# Patient Record
Sex: FEMALE | Race: WHITE | NOT HISPANIC OR LATINO | Employment: OTHER | URBAN - METROPOLITAN AREA
[De-identification: names, ages, dates, MRNs, and addresses within clinical notes are randomized per-mention and may not be internally consistent; named-entity substitution may affect disease eponyms.]

---

## 2023-01-25 PROBLEM — E78.00 HYPERCHOLESTEROLEMIA: Status: ACTIVE | Noted: 2023-01-25

## 2023-01-25 PROBLEM — I48.91 ATRIAL FIBRILLATION WITH RVR (MULTI): Status: ACTIVE | Noted: 2023-01-25

## 2023-01-25 PROBLEM — L80 VITILIGO: Status: ACTIVE | Noted: 2023-01-25

## 2023-01-25 PROBLEM — M25.562 ACUTE PAIN OF BOTH KNEES: Status: ACTIVE | Noted: 2023-01-25

## 2023-01-25 PROBLEM — L65.9 ALOPECIA: Status: ACTIVE | Noted: 2023-01-25

## 2023-01-25 PROBLEM — L20.9 ATOPIC DERMATITIS: Status: ACTIVE | Noted: 2023-01-25

## 2023-01-25 PROBLEM — L71.9 ROSACEA: Status: ACTIVE | Noted: 2023-01-25

## 2023-01-25 PROBLEM — M85.80 OSTEOPENIA: Status: ACTIVE | Noted: 2023-01-25

## 2023-01-25 PROBLEM — J30.9 ALLERGIC RHINITIS: Status: ACTIVE | Noted: 2023-01-25

## 2023-01-25 PROBLEM — L72.3 SEBACEOUS CYST: Status: ACTIVE | Noted: 2023-01-25

## 2023-01-25 PROBLEM — K21.9 ESOPHAGEAL REFLUX: Status: ACTIVE | Noted: 2023-01-25

## 2023-01-25 PROBLEM — M25.561 ACUTE PAIN OF BOTH KNEES: Status: ACTIVE | Noted: 2023-01-25

## 2023-01-25 PROBLEM — I10 HYPERTENSION: Status: ACTIVE | Noted: 2023-01-25

## 2023-01-25 PROBLEM — E66.9 OBESITY: Status: ACTIVE | Noted: 2023-01-25

## 2023-01-25 PROBLEM — K22.70 BARRETT'S ESOPHAGUS: Status: ACTIVE | Noted: 2023-01-25

## 2023-01-25 RX ORDER — DESONIDE 0.5 MG/G
CREAM TOPICAL
COMMUNITY
Start: 2021-09-27

## 2023-01-25 RX ORDER — AZELAIC ACID 0.15 G/G
GEL TOPICAL 2 TIMES DAILY
COMMUNITY
Start: 2011-03-22

## 2023-01-25 RX ORDER — METOPROLOL SUCCINATE 25 MG/1
TABLET, EXTENDED RELEASE ORAL
COMMUNITY
Start: 2021-11-04

## 2023-01-25 RX ORDER — FAMOTIDINE 20 MG/1
1 TABLET, FILM COATED ORAL NIGHTLY
COMMUNITY
Start: 2021-04-14 | End: 2023-02-06 | Stop reason: ENTERED-IN-ERROR

## 2023-01-25 RX ORDER — DOFETILIDE 0.5 MG/1
1 CAPSULE ORAL 2 TIMES DAILY
COMMUNITY
Start: 2020-09-21

## 2023-01-25 RX ORDER — LOSARTAN POTASSIUM 100 MG/1
1 TABLET ORAL DAILY
COMMUNITY
Start: 2012-12-06

## 2023-01-25 RX ORDER — AMLODIPINE BESYLATE 2.5 MG/1
1 TABLET ORAL DAILY
COMMUNITY
Start: 2022-07-06 | End: 2023-03-09 | Stop reason: DRUGHIGH

## 2023-01-25 RX ORDER — FUROSEMIDE 20 MG/1
1 TABLET ORAL DAILY PRN
COMMUNITY
Start: 2020-09-24 | End: 2023-04-06 | Stop reason: ALTCHOICE

## 2023-01-25 RX ORDER — MULTIVIT-MIN/FERROUS FUMARATE 15 MG
1 TABLET ORAL DAILY
COMMUNITY
Start: 2020-09-24 | End: 2023-02-06 | Stop reason: ENTERED-IN-ERROR

## 2023-01-25 RX ORDER — PRAVASTATIN SODIUM 20 MG/1
1 TABLET ORAL DAILY
COMMUNITY
Start: 2012-08-02 | End: 2023-12-11 | Stop reason: SDUPTHER

## 2023-01-25 RX ORDER — ASCORBIC ACID 500 MG
1 TABLET ORAL DAILY
COMMUNITY
Start: 2020-09-24

## 2023-01-25 RX ORDER — KETOCONAZOLE 20 MG/ML
SHAMPOO, SUSPENSION TOPICAL
COMMUNITY
Start: 2021-12-08

## 2023-02-06 RX ORDER — FAMOTIDINE 40 MG/1
TABLET, FILM COATED ORAL
COMMUNITY

## 2023-02-06 RX ORDER — METOPROLOL SUCCINATE 50 MG/1
50 TABLET, EXTENDED RELEASE ORAL DAILY
COMMUNITY
End: 2023-04-06 | Stop reason: ALTCHOICE

## 2023-03-09 ENCOUNTER — OFFICE VISIT (OUTPATIENT)
Dept: PRIMARY CARE | Facility: CLINIC | Age: 73
End: 2023-03-09
Payer: MEDICARE

## 2023-03-09 VITALS
SYSTOLIC BLOOD PRESSURE: 151 MMHG | OXYGEN SATURATION: 100 % | DIASTOLIC BLOOD PRESSURE: 63 MMHG | WEIGHT: 184.5 LBS | BODY MASS INDEX: 31.5 KG/M2 | HEIGHT: 64 IN

## 2023-03-09 DIAGNOSIS — Z71.89 ACP (ADVANCE CARE PLANNING): ICD-10-CM

## 2023-03-09 DIAGNOSIS — Z13.39 ALCOHOL SCREENING: ICD-10-CM

## 2023-03-09 DIAGNOSIS — K22.70 BARRETT'S ESOPHAGUS WITHOUT DYSPLASIA: ICD-10-CM

## 2023-03-09 DIAGNOSIS — E78.00 HYPERCHOLESTEROLEMIA: ICD-10-CM

## 2023-03-09 DIAGNOSIS — Z00.00 ROUTINE GENERAL MEDICAL EXAMINATION AT HEALTH CARE FACILITY: Primary | ICD-10-CM

## 2023-03-09 DIAGNOSIS — K21.9 GASTROESOPHAGEAL REFLUX DISEASE WITHOUT ESOPHAGITIS: ICD-10-CM

## 2023-03-09 DIAGNOSIS — Z13.89 SCREENING FOR MULTIPLE CONDITIONS: ICD-10-CM

## 2023-03-09 DIAGNOSIS — E66.09 CLASS 1 OBESITY DUE TO EXCESS CALORIES WITH SERIOUS COMORBIDITY AND BODY MASS INDEX (BMI) OF 32.0 TO 32.9 IN ADULT: ICD-10-CM

## 2023-03-09 DIAGNOSIS — Z00.00 MEDICARE ANNUAL WELLNESS VISIT, SUBSEQUENT: ICD-10-CM

## 2023-03-09 DIAGNOSIS — I10 PRIMARY HYPERTENSION: ICD-10-CM

## 2023-03-09 DIAGNOSIS — I48.91 ATRIAL FIBRILLATION WITH RVR (MULTI): ICD-10-CM

## 2023-03-09 DIAGNOSIS — Z78.9 FULL CODE STATUS: ICD-10-CM

## 2023-03-09 PROCEDURE — G0442 ANNUAL ALCOHOL SCREEN 15 MIN: HCPCS | Performed by: INTERNAL MEDICINE

## 2023-03-09 PROCEDURE — 3077F SYST BP >= 140 MM HG: CPT | Performed by: INTERNAL MEDICINE

## 2023-03-09 PROCEDURE — 3078F DIAST BP <80 MM HG: CPT | Performed by: INTERNAL MEDICINE

## 2023-03-09 PROCEDURE — 1157F ADVNC CARE PLAN IN RCRD: CPT | Performed by: INTERNAL MEDICINE

## 2023-03-09 PROCEDURE — 99214 OFFICE O/P EST MOD 30 MIN: CPT | Performed by: INTERNAL MEDICINE

## 2023-03-09 PROCEDURE — 1160F RVW MEDS BY RX/DR IN RCRD: CPT | Performed by: INTERNAL MEDICINE

## 2023-03-09 PROCEDURE — 3008F BODY MASS INDEX DOCD: CPT | Performed by: INTERNAL MEDICINE

## 2023-03-09 PROCEDURE — 99497 ADVNCD CARE PLAN 30 MIN: CPT | Performed by: INTERNAL MEDICINE

## 2023-03-09 PROCEDURE — 1036F TOBACCO NON-USER: CPT | Performed by: INTERNAL MEDICINE

## 2023-03-09 PROCEDURE — 1170F FXNL STATUS ASSESSED: CPT | Performed by: INTERNAL MEDICINE

## 2023-03-09 PROCEDURE — 1159F MED LIST DOCD IN RCRD: CPT | Performed by: INTERNAL MEDICINE

## 2023-03-09 PROCEDURE — G0439 PPPS, SUBSEQ VISIT: HCPCS | Performed by: INTERNAL MEDICINE

## 2023-03-09 RX ORDER — AMLODIPINE BESYLATE 5 MG/1
5 TABLET ORAL
COMMUNITY
Start: 2023-03-05

## 2023-03-09 SDOH — ECONOMIC STABILITY: HOUSING INSECURITY
IN THE LAST 12 MONTHS, WAS THERE A TIME WHEN YOU DID NOT HAVE A STEADY PLACE TO SLEEP OR SLEPT IN A SHELTER (INCLUDING NOW)?: NO

## 2023-03-09 SDOH — ECONOMIC STABILITY: FOOD INSECURITY: WITHIN THE PAST 12 MONTHS, YOU WORRIED THAT YOUR FOOD WOULD RUN OUT BEFORE YOU GOT MONEY TO BUY MORE.: NEVER TRUE

## 2023-03-09 SDOH — ECONOMIC STABILITY: INCOME INSECURITY: IN THE LAST 12 MONTHS, WAS THERE A TIME WHEN YOU WERE NOT ABLE TO PAY THE MORTGAGE OR RENT ON TIME?: NO

## 2023-03-09 SDOH — ECONOMIC STABILITY: TRANSPORTATION INSECURITY
IN THE PAST 12 MONTHS, HAS THE LACK OF TRANSPORTATION KEPT YOU FROM MEDICAL APPOINTMENTS OR FROM GETTING MEDICATIONS?: NO

## 2023-03-09 SDOH — ECONOMIC STABILITY: FOOD INSECURITY: WITHIN THE PAST 12 MONTHS, THE FOOD YOU BOUGHT JUST DIDN'T LAST AND YOU DIDN'T HAVE MONEY TO GET MORE.: NEVER TRUE

## 2023-03-09 SDOH — ECONOMIC STABILITY: TRANSPORTATION INSECURITY
IN THE PAST 12 MONTHS, HAS LACK OF TRANSPORTATION KEPT YOU FROM MEETINGS, WORK, OR FROM GETTING THINGS NEEDED FOR DAILY LIVING?: NO

## 2023-03-09 ASSESSMENT — ACTIVITIES OF DAILY LIVING (ADL)
BATHING: INDEPENDENT
DOING HOUSEWORK: INDEPENDENT
MANAGING FINANCES: INDEPENDENT
STIL DRIVING: YES
HEARING - LEFT EAR: FUNCTIONAL
PREPARING MEALS: INDEPENDENT
EATING: INDEPENDENT
FEEDING YOURSELF: INDEPENDENT
USING TRANSPORTATION: INDEPENDENT
TAKING MEDICATION: INDEPENDENT
GROCERY SHOPPING: INDEPENDENT
ADEQUATE_TO_COMPLETE_ADL: YES
NEEDS ASSISTANCE WITH FOOD: INDEPENDENT
PATIENT'S MEMORY ADEQUATE TO SAFELY COMPLETE DAILY ACTIVITIES?: YES
TOILETING: INDEPENDENT
JUDGMENT_ADEQUATE_SAFELY_COMPLETE_DAILY_ACTIVITIES: YES
DRESSING YOURSELF: INDEPENDENT
HEARING - RIGHT EAR: FUNCTIONAL
PILL BOX USED: YES
WALKS IN HOME: INDEPENDENT
USING TELEPHONE: INDEPENDENT
GROOMING: INDEPENDENT

## 2023-03-09 ASSESSMENT — ENCOUNTER SYMPTOMS
CHILLS: 0
VOMITING: 0
CONSTIPATION: 0
HEMATURIA: 0
DIZZINESS: 0
DYSURIA: 0
DIFFICULTY URINATING: 0
WEAKNESS: 0
FATIGUE: 0
MYALGIAS: 0
ARTHRALGIAS: 0
NAUSEA: 0
HEADACHES: 0
SHORTNESS OF BREATH: 0
FREQUENCY: 0
FEVER: 0
LIGHT-HEADEDNESS: 0
SORE THROAT: 0
PALPITATIONS: 0
DIARRHEA: 0
COUGH: 0

## 2023-03-09 ASSESSMENT — LIFESTYLE VARIABLES
AUDIT-C TOTAL SCORE: 0
SKIP TO QUESTIONS 9-10: 1
HOW OFTEN DO YOU HAVE SIX OR MORE DRINKS ON ONE OCCASION: NEVER
HOW OFTEN DO YOU HAVE A DRINK CONTAINING ALCOHOL: NEVER
HOW MANY STANDARD DRINKS CONTAINING ALCOHOL DO YOU HAVE ON A TYPICAL DAY: PATIENT DOES NOT DRINK

## 2023-03-09 ASSESSMENT — SOCIAL DETERMINANTS OF HEALTH (SDOH)
WITHIN THE LAST YEAR, HAVE YOU BEEN AFRAID OF YOUR PARTNER OR EX-PARTNER?: NO
DO YOU BELONG TO ANY CLUBS OR ORGANIZATIONS SUCH AS CHURCH GROUPS UNIONS, FRATERNAL OR ATHLETIC GROUPS, OR SCHOOL GROUPS?: NO
HOW OFTEN DO YOU ATTEND CHURCH OR RELIGIOUS SERVICES?: NEVER
IN A TYPICAL WEEK, HOW MANY TIMES DO YOU TALK ON THE PHONE WITH FAMILY, FRIENDS, OR NEIGHBORS?: ONCE A WEEK
ARE YOU MARRIED, WIDOWED, DIVORCED, SEPARATED, NEVER MARRIED, OR LIVING WITH A PARTNER?: NEVER MARRIED
WITHIN THE LAST YEAR, HAVE YOU BEEN HUMILIATED OR EMOTIONALLY ABUSED IN OTHER WAYS BY YOUR PARTNER OR EX-PARTNER?: NO
HOW HARD IS IT FOR YOU TO PAY FOR THE VERY BASICS LIKE FOOD, HOUSING, MEDICAL CARE, AND HEATING?: NOT HARD AT ALL
WITHIN THE LAST YEAR, HAVE TO BEEN RAPED OR FORCED TO HAVE ANY KIND OF SEXUAL ACTIVITY BY YOUR PARTNER OR EX-PARTNER?: NO
WITHIN THE LAST YEAR, HAVE YOU BEEN KICKED, HIT, SLAPPED, OR OTHERWISE PHYSICALLY HURT BY YOUR PARTNER OR EX-PARTNER?: NO
HOW OFTEN DO YOU ATTENT MEETINGS OF THE CLUB OR ORGANIZATION YOU BELONG TO?: NEVER
HOW OFTEN DO YOU GET TOGETHER WITH FRIENDS OR RELATIVES?: ONCE A WEEK

## 2023-03-09 ASSESSMENT — PATIENT HEALTH QUESTIONNAIRE - PHQ9
SUM OF ALL RESPONSES TO PHQ9 QUESTIONS 1 AND 2: 0
1. LITTLE INTEREST OR PLEASURE IN DOING THINGS: NOT AT ALL
2. FEELING DOWN, DEPRESSED OR HOPELESS: NOT AT ALL

## 2023-03-09 ASSESSMENT — PAIN SCALES - GENERAL: PAINLEVEL: 0-NO PAIN

## 2023-03-09 NOTE — ASSESSMENT & PLAN NOTE
Advance care planning was discussed today.  The power of  we have is an old one according to the patient and she has named a different sister.  She will bring us a copy of the new power of .  She has been made it clear that she does not want to be kept alive long-term on machines if she has a poor quality of life.  At this time she request to be a full code and her chart will be marked accordingly.

## 2023-03-09 NOTE — PATIENT INSTRUCTIONS
Please bring a copy of your living will and DURABLE POWER OF  for healthcare to Dr. Garcia's office so we can place a copy in your chart for emergencies.     Follow up Dr LY in 1 month for HTN...Please bring home bp readings to next appt.

## 2023-03-09 NOTE — ASSESSMENT & PLAN NOTE
Patient's blood pressure is elevated in my office today however she states at home it is under good control.  I have asked her to do a series of blood pressure readings at home and follow-up with me in 1 month to see if we need to adjust her blood pressure medications.

## 2023-03-09 NOTE — PROGRESS NOTES
"Subjective   Reason for Visit: Vonnie SALAS Friend is an 72 y.o. female here for a Medicare Wellness visit.          Reviewed all medications by prescribing practitioner or clinical pharmacist (such as prescriptions, OTCs, herbal therapies and supplements) and documented in the medical record.    Patient is here for her annual wellness visit as well as follow-up on her A-fib, reflux with Ferrera's esophagus obesity high cholesterol and medication management.  Overall patient feels well and denies any new complaints.  While going over vaccines it was clear that she is due for Tdap however she turns it down at this time but will consider it again later.  Patient continues to follow-up with Dr. Cosme for cardiology.                              Patient Care Team:  Светлана Garcia MD as PCP - General  Светлана Garcia MD as PCP - [employer]age PCP     Review of Systems   Constitutional:  Negative for chills, fatigue and fever.   HENT:  Negative for sore throat.    Eyes:  Negative for visual disturbance.   Respiratory:  Negative for cough and shortness of breath.    Cardiovascular:  Negative for chest pain, palpitations and leg swelling.   Gastrointestinal:  Negative for constipation, diarrhea, nausea and vomiting.   Genitourinary:  Negative for difficulty urinating, dysuria, frequency, hematuria and urgency.   Musculoskeletal:  Negative for arthralgias and myalgias.   Skin:  Negative for rash.   Neurological:  Negative for dizziness, syncope, weakness, light-headedness and headaches.       Objective   Vitals:  /63 (BP Location: Left arm, Patient Position: Sitting)   Ht 1.613 m (5' 3.5\")   Wt 83.7 kg (184 lb 8 oz)   SpO2 100%   BMI 32.17 kg/m²       Physical Exam  Constitutional:       Appearance: Normal appearance.   HENT:      Head: Normocephalic and atraumatic.      Nose: Nose normal.   Eyes:      Extraocular Movements: Extraocular movements intact.      Pupils: Pupils are equal, round, and reactive to " light.   Cardiovascular:      Rate and Rhythm: Normal rate and regular rhythm.   Pulmonary:      Breath sounds: Normal breath sounds.   Abdominal:      General: Abdomen is flat. Bowel sounds are normal.      Palpations: Abdomen is soft.   Musculoskeletal:      Right lower leg: No edema.      Left lower leg: No edema.   Neurological:      Mental Status: She is alert.     Patient is obese.    Assessment/Plan   Problem List Items Addressed This Visit          Circulatory    Atrial fibrillation with RVR (CMS/[facility])    Current Assessment & Plan     Patient has a history of intermittent atrial fibrillation.  She remains on Eliquis.  Her cardiologist is Dr. Cosme.  She appears to be stable in sinus rhythm at this time         Relevant Medications    amLODIPine (Norvasc) 5 mg tablet    Hypertension    Current Assessment & Plan     Patient's blood pressure is elevated in my office today however she states at home it is under good control.  I have asked her to do a series of blood pressure readings at home and follow-up with me in 1 month to see if we need to adjust her blood pressure medications.            Digestive    Ferrera's esophagus    Current Assessment & Plan     Patient has GERD with Ferrera's esophagus however she is monitored by GI and appears to be stable.         Esophageal reflux    Current Assessment & Plan     Patient denies any breakthrough symptoms and reflux is stable.            Endocrine/Metabolic    Obesity    Current Assessment & Plan     Patient was counseled about decreased caloric intake and improving body weight.            Other    Hypercholesterolemia    Current Assessment & Plan     Cholesterol level is stable with pravastatin 20 mg daily.  She denies muscle aches or other side effects.         Medicare annual wellness visit, subsequent    Current Assessment & Plan     Annual wellness visit completed today         ACP (advance care planning)    Current Assessment & Plan     Advance care planning  was discussed today.  The power of  we have is an old one according to the patient and she has named a different sister.  She will bring us a copy of the new power of .  She has been made it clear that she does not want to be kept alive long-term on machines if she has a poor quality of life.  At this time she request to be a full code and her chart will be marked accordingly.         Alcohol screening    Current Assessment & Plan     Alcohol screen was negative as she does not drink alcohol.         Screening for multiple conditions    Full code status    Current Assessment & Plan     CODE STATUS was reviewed today including DNR comfort care, DNR Comfort Care arrest and full code.  Patient wishes to be a full code and understands this may include CPR, chest compressions, electrical cardioversion, intubation and ventilation.          Other Visit Diagnoses       Routine general medical examination at health care facility    -  Primary

## 2023-03-09 NOTE — ASSESSMENT & PLAN NOTE
Cholesterol level is stable with pravastatin 20 mg daily.  She denies muscle aches or other side effects.

## 2023-03-09 NOTE — ASSESSMENT & PLAN NOTE
Patient has a history of intermittent atrial fibrillation.  She remains on Eliquis.  Her cardiologist is Dr. Cosme.  She appears to be stable in sinus rhythm at this time

## 2023-03-09 NOTE — ASSESSMENT & PLAN NOTE
>>ASSESSMENT AND PLAN FOR [employer]ESITY WRITTEN ON 3/9/2023  4:41 PM BY JULIO NORMAN MD    Patient was counseled about decreased caloric intake and improving body weight.

## 2023-03-09 NOTE — PROGRESS NOTES
"Subjective   Reason for Visit: Vonnie SALAS Friend is an 72 y.o. female here for a Medicare Wellness visit.          Reviewed all medications by prescribing practitioner or clinical pharmacist (such as prescriptions, OTCs, herbal therapies and supplements) and documented in the medical record.    HPI                        Patient Care Team:  Светлана Garcia MD as PCP - General  Светлана Garcia MD as PCP - [employer]vantage PCP     Review of Systems    Objective   Vitals:  /63 (BP Location: Left arm, Patient Position: Sitting)   Ht 1.613 m (5' 3.5\")   Wt 83.7 kg (184 lb 8 oz)   SpO2 100%   BMI 32.17 kg/m²       Physical Exam    Assessment/Plan   Problem List Items Addressed This Visit    None         "

## 2023-03-09 NOTE — ASSESSMENT & PLAN NOTE
CODE STATUS was reviewed today including DNR comfort care, DNR Comfort Care arrest and full code.  Patient wishes to be a full code and understands this may include CPR, chest compressions, electrical cardioversion, intubation and ventilation.

## 2023-04-06 ENCOUNTER — OFFICE VISIT (OUTPATIENT)
Dept: PRIMARY CARE | Facility: CLINIC | Age: 73
End: 2023-04-06
Payer: MEDICARE

## 2023-04-06 VITALS
OXYGEN SATURATION: 99 % | HEART RATE: 55 BPM | DIASTOLIC BLOOD PRESSURE: 68 MMHG | WEIGHT: 183.6 LBS | HEIGHT: 63 IN | SYSTOLIC BLOOD PRESSURE: 130 MMHG | BODY MASS INDEX: 32.53 KG/M2

## 2023-04-06 DIAGNOSIS — I48.91 ATRIAL FIBRILLATION WITH RVR (MULTI): ICD-10-CM

## 2023-04-06 DIAGNOSIS — I10 PRIMARY HYPERTENSION: Primary | ICD-10-CM

## 2023-04-06 DIAGNOSIS — R00.1 BRADYCARDIA: ICD-10-CM

## 2023-04-06 PROBLEM — M81.0 OSTEOPOROSIS: Status: ACTIVE | Noted: 2023-04-06

## 2023-04-06 PROCEDURE — 3008F BODY MASS INDEX DOCD: CPT | Performed by: INTERNAL MEDICINE

## 2023-04-06 PROCEDURE — 3078F DIAST BP <80 MM HG: CPT | Performed by: INTERNAL MEDICINE

## 2023-04-06 PROCEDURE — 99214 OFFICE O/P EST MOD 30 MIN: CPT | Performed by: INTERNAL MEDICINE

## 2023-04-06 PROCEDURE — 1157F ADVNC CARE PLAN IN RCRD: CPT | Performed by: INTERNAL MEDICINE

## 2023-04-06 PROCEDURE — 1160F RVW MEDS BY RX/DR IN RCRD: CPT | Performed by: INTERNAL MEDICINE

## 2023-04-06 PROCEDURE — 3075F SYST BP GE 130 - 139MM HG: CPT | Performed by: INTERNAL MEDICINE

## 2023-04-06 PROCEDURE — 1036F TOBACCO NON-USER: CPT | Performed by: INTERNAL MEDICINE

## 2023-04-06 PROCEDURE — 1159F MED LIST DOCD IN RCRD: CPT | Performed by: INTERNAL MEDICINE

## 2023-04-06 RX ORDER — CAMPHOR 0.45 %
25 GEL (GRAM) TOPICAL
COMMUNITY
Start: 2020-09-22

## 2023-04-06 ASSESSMENT — ENCOUNTER SYMPTOMS
HEADACHES: 0
CONSTIPATION: 0
CHILLS: 0
SORE THROAT: 0
PALPITATIONS: 0
FREQUENCY: 0
DIFFICULTY URINATING: 0
DIARRHEA: 0
MYALGIAS: 0
FATIGUE: 0
DIZZINESS: 0
SHORTNESS OF BREATH: 0
ARTHRALGIAS: 0
COUGH: 0
LIGHT-HEADEDNESS: 0
HEMATURIA: 0
VOMITING: 0
WEAKNESS: 0
DYSURIA: 0
FEVER: 0
NAUSEA: 0

## 2023-04-06 ASSESSMENT — PATIENT HEALTH QUESTIONNAIRE - PHQ9
2. FEELING DOWN, DEPRESSED OR HOPELESS: NOT AT ALL
SUM OF ALL RESPONSES TO PHQ9 QUESTIONS 1 AND 2: 0
1. LITTLE INTEREST OR PLEASURE IN DOING THINGS: NOT AT ALL

## 2023-04-06 ASSESSMENT — PAIN SCALES - GENERAL: PAINLEVEL: 0-NO PAIN

## 2023-04-06 NOTE — ASSESSMENT & PLAN NOTE
Blood pressure is good at home and improved here so no further actions will be taken.  Patient will follow-up with me in 4 months.

## 2023-04-06 NOTE — PROGRESS NOTES
"Subjective   Patient ID: Vonnie A Friend is a 72 y.o. female who presents for GERD and HTN management.  BN    Patient is here for 1 month follow-up on hypertension also to review her pulse with A-fib reflux symptoms.  Patient wanted me to know that she has a med alert from home        Review of Systems   Constitutional:  Negative for chills, fatigue and fever.   HENT:  Negative for sore throat.    Eyes:  Negative for visual disturbance.   Respiratory:  Negative for cough and shortness of breath.    Cardiovascular:  Negative for chest pain, palpitations and leg swelling.   Gastrointestinal:  Negative for constipation, diarrhea, nausea and vomiting.   Genitourinary:  Negative for difficulty urinating, dysuria, frequency, hematuria and urgency.   Musculoskeletal:  Negative for arthralgias and myalgias.   Skin:  Negative for rash.   Neurological:  Negative for dizziness, syncope, weakness, light-headedness and headaches.       Objective   Physical Exam  Constitutional:       Appearance: Normal appearance.   HENT:      Head: Normocephalic and atraumatic.      Nose: Nose normal.   Eyes:      Extraocular Movements: Extraocular movements intact.      Pupils: Pupils are equal, round, and reactive to light.   Cardiovascular:      Rate and Rhythm: Normal rate and regular rhythm.   Pulmonary:      Breath sounds: Normal breath sounds.   Abdominal:      General: Abdomen is flat. Bowel sounds are normal.      Palpations: Abdomen is soft.   Musculoskeletal:      Right lower leg: No edema.      Left lower leg: No edema.   Neurological:      Mental Status: She is alert.     /68   Pulse 55   Ht 1.6 m (5' 3\")   Wt 83.3 kg (183 lb 9.6 oz)   SpO2 99%   BMI 32.52 kg/m²       Assessment/Plan   Problem List Items Addressed This Visit          Circulatory    Atrial fibrillation with RVR (CMS/HCC)    Hypertension - Primary     Blood pressure is good at home and improved here so no further actions will be taken.  Patient will " follow-up with me in 4 months.         Bradycardia     Patient has been somewhat bradycardic with a heart rate in the 40s to 50s.  Dr. Cosme her cardiologist has been backing off on some of her beta-blockers but she is totally asymptomatic so he is comfortable with her at this level.          Follow-up in 4 months  Annual labs due in November         It has been a pleasure seeing you.

## 2023-04-06 NOTE — ASSESSMENT & PLAN NOTE
Patient has been somewhat bradycardic with a heart rate in the 40s to 50s.  Dr. Cosme her cardiologist has been backing off on some of her beta-blockers but she is totally asymptomatic so he is comfortable with her at this level.

## 2023-07-27 ENCOUNTER — TELEPHONE (OUTPATIENT)
Dept: PRIMARY CARE | Facility: CLINIC | Age: 73
End: 2023-07-27
Payer: MEDICARE

## 2023-07-27 NOTE — TELEPHONE ENCOUNTER
Patient notified and read message.  BN     ----- Message from Светлана Garcia MD sent at 7/27/2023  2:20 PM EDT -----  Notify patient she has osteopenia on her dxa or bone density.  This is a precursor to osteoporosis.  She should take a minimum of 2000iu of VitD3 OTC every day and a minimum of 500mg of calcium 3 times a day.  Dr Garcia recommends gummie calcium chews so they are easier to take after each meal.

## 2023-08-01 ENCOUNTER — TELEPHONE (OUTPATIENT)
Dept: PRIMARY CARE | Facility: CLINIC | Age: 73
End: 2023-08-01
Payer: MEDICARE

## 2023-08-01 NOTE — TELEPHONE ENCOUNTER
----- Message from Светлана Garcia MD sent at 8/1/2023  2:18 PM EDT -----  Please notify patient that her mammogram showed no evidence of cancer.  Her next mammogram will be due in 1 year.

## 2023-08-08 ENCOUNTER — OFFICE VISIT (OUTPATIENT)
Dept: PRIMARY CARE | Facility: CLINIC | Age: 73
End: 2023-08-08
Payer: MEDICARE

## 2023-08-08 VITALS
BODY MASS INDEX: 32.99 KG/M2 | DIASTOLIC BLOOD PRESSURE: 60 MMHG | HEART RATE: 51 BPM | HEIGHT: 63 IN | OXYGEN SATURATION: 99 % | WEIGHT: 186.2 LBS | SYSTOLIC BLOOD PRESSURE: 130 MMHG

## 2023-08-08 DIAGNOSIS — L65.9 ALOPECIA: ICD-10-CM

## 2023-08-08 DIAGNOSIS — I73.9 PERIPHERAL VASCULAR DISEASE, UNSPECIFIED (CMS-HCC): Primary | ICD-10-CM

## 2023-08-08 DIAGNOSIS — D68.69 OTHER THROMBOPHILIA (MULTI): ICD-10-CM

## 2023-08-08 DIAGNOSIS — I48.91 ATRIAL FIBRILLATION WITH RVR (MULTI): ICD-10-CM

## 2023-08-08 DIAGNOSIS — K22.70 BARRETT'S ESOPHAGUS WITHOUT DYSPLASIA: ICD-10-CM

## 2023-08-08 DIAGNOSIS — E78.00 HYPERCHOLESTEROLEMIA: ICD-10-CM

## 2023-08-08 DIAGNOSIS — M81.0 AGE-RELATED OSTEOPOROSIS WITHOUT CURRENT PATHOLOGICAL FRACTURE: ICD-10-CM

## 2023-08-08 DIAGNOSIS — I10 PRIMARY HYPERTENSION: ICD-10-CM

## 2023-08-08 DIAGNOSIS — K21.9 GASTROESOPHAGEAL REFLUX DISEASE WITHOUT ESOPHAGITIS: ICD-10-CM

## 2023-08-08 PROCEDURE — 1159F MED LIST DOCD IN RCRD: CPT | Performed by: INTERNAL MEDICINE

## 2023-08-08 PROCEDURE — 3075F SYST BP GE 130 - 139MM HG: CPT | Performed by: INTERNAL MEDICINE

## 2023-08-08 PROCEDURE — 99214 OFFICE O/P EST MOD 30 MIN: CPT | Performed by: INTERNAL MEDICINE

## 2023-08-08 PROCEDURE — 1160F RVW MEDS BY RX/DR IN RCRD: CPT | Performed by: INTERNAL MEDICINE

## 2023-08-08 PROCEDURE — 3078F DIAST BP <80 MM HG: CPT | Performed by: INTERNAL MEDICINE

## 2023-08-08 PROCEDURE — 1126F AMNT PAIN NOTED NONE PRSNT: CPT | Performed by: INTERNAL MEDICINE

## 2023-08-08 PROCEDURE — 1036F TOBACCO NON-USER: CPT | Performed by: INTERNAL MEDICINE

## 2023-08-08 PROCEDURE — 1157F ADVNC CARE PLAN IN RCRD: CPT | Performed by: INTERNAL MEDICINE

## 2023-08-08 PROCEDURE — 3008F BODY MASS INDEX DOCD: CPT | Performed by: INTERNAL MEDICINE

## 2023-08-08 RX ORDER — POLYETHYLENE GLYCOL-3350 AND ELECTROLYTES 236; 6.74; 5.86; 2.97; 22.74 G/274.31G; G/274.31G; G/274.31G; G/274.31G; G/274.31G
POWDER, FOR SOLUTION ORAL
COMMUNITY
Start: 2023-07-19

## 2023-08-08 RX ORDER — ASCORBIC ACID 125 MG
5000 TABLET,CHEWABLE ORAL 2 TIMES DAILY
COMMUNITY

## 2023-08-08 ASSESSMENT — ENCOUNTER SYMPTOMS
MYALGIAS: 0
FREQUENCY: 0
DIZZINESS: 0
PALPITATIONS: 0
VOMITING: 0
NAUSEA: 0
COUGH: 0
ARTHRALGIAS: 0
CONSTIPATION: 0
HEADACHES: 0
FEVER: 0
CHILLS: 0
WEAKNESS: 0
HEMATURIA: 0
LIGHT-HEADEDNESS: 0
DYSURIA: 0
SHORTNESS OF BREATH: 0
DIFFICULTY URINATING: 0
DIARRHEA: 0
FATIGUE: 0
SORE THROAT: 0

## 2023-08-08 ASSESSMENT — PAIN SCALES - GENERAL: PAINLEVEL: 0-NO PAIN

## 2023-08-08 ASSESSMENT — PATIENT HEALTH QUESTIONNAIRE - PHQ9: 1. LITTLE INTEREST OR PLEASURE IN DOING THINGS: NOT AT ALL
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
## 2023-08-08 NOTE — ASSESSMENT & PLAN NOTE
Blood pressure is stable at this time but on the borderline as her systolic is 130.  She is to monitor blood pressure readings at home and call us if it starts going over the 130 [place].
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
## 2023-08-08 NOTE — ASSESSMENT & PLAN NOTE
Patient does have occasional episodes of racing heart at which she takes the extra half dose of metoprolol and it helps.  I told her as long as the half dose of metoprolol is working she can take that periodically.  She only has to do that once or twice a month currently.

## 2023-08-08 NOTE — ASSESSMENT & PLAN NOTE
Patient is stable on pravastatin 20 mg daily but is due for liver check and cholesterol profile before next appointment.

## 2023-08-31 VITALS
HEART RATE: 80 BPM | SYSTOLIC BLOOD PRESSURE: 88 MMHG | RESPIRATION RATE: 13 BRPM | HEART RATE: 71 BPM | SYSTOLIC BLOOD PRESSURE: 92 MMHG | WEIGHT: 185 LBS | SYSTOLIC BLOOD PRESSURE: 100 MMHG | SYSTOLIC BLOOD PRESSURE: 115 MMHG | OXYGEN SATURATION: 99 % | SYSTOLIC BLOOD PRESSURE: 114 MMHG | RESPIRATION RATE: 15 BRPM | SYSTOLIC BLOOD PRESSURE: 87 MMHG | DIASTOLIC BLOOD PRESSURE: 53 MMHG | SYSTOLIC BLOOD PRESSURE: 117 MMHG | HEART RATE: 71 BPM | RESPIRATION RATE: 6 BRPM | DIASTOLIC BLOOD PRESSURE: 47 MMHG | SYSTOLIC BLOOD PRESSURE: 84 MMHG | SYSTOLIC BLOOD PRESSURE: 82 MMHG | HEART RATE: 80 BPM | HEART RATE: 49 BPM | SYSTOLIC BLOOD PRESSURE: 100 MMHG | SYSTOLIC BLOOD PRESSURE: 85 MMHG | SYSTOLIC BLOOD PRESSURE: 115 MMHG | DIASTOLIC BLOOD PRESSURE: 38 MMHG | HEART RATE: 71 BPM | HEART RATE: 55 BPM | DIASTOLIC BLOOD PRESSURE: 35 MMHG | HEART RATE: 70 BPM | SYSTOLIC BLOOD PRESSURE: 82 MMHG | HEART RATE: 43 BPM | HEART RATE: 47 BPM | RESPIRATION RATE: 16 BRPM | HEIGHT: 63 IN | HEART RATE: 70 BPM | HEART RATE: 61 BPM | RESPIRATION RATE: 15 BRPM | HEIGHT: 63 IN | RESPIRATION RATE: 14 BRPM | DIASTOLIC BLOOD PRESSURE: 38 MMHG | SYSTOLIC BLOOD PRESSURE: 92 MMHG | RESPIRATION RATE: 17 BRPM | DIASTOLIC BLOOD PRESSURE: 29 MMHG | DIASTOLIC BLOOD PRESSURE: 29 MMHG | SYSTOLIC BLOOD PRESSURE: 114 MMHG | RESPIRATION RATE: 17 BRPM | RESPIRATION RATE: 6 BRPM | SYSTOLIC BLOOD PRESSURE: 85 MMHG | DIASTOLIC BLOOD PRESSURE: 47 MMHG | RESPIRATION RATE: 14 BRPM | SYSTOLIC BLOOD PRESSURE: 117 MMHG | RESPIRATION RATE: 18 BRPM | DIASTOLIC BLOOD PRESSURE: 32 MMHG | SYSTOLIC BLOOD PRESSURE: 88 MMHG | DIASTOLIC BLOOD PRESSURE: 29 MMHG | DIASTOLIC BLOOD PRESSURE: 30 MMHG | DIASTOLIC BLOOD PRESSURE: 56 MMHG | DIASTOLIC BLOOD PRESSURE: 49 MMHG | RESPIRATION RATE: 6 BRPM | DIASTOLIC BLOOD PRESSURE: 30 MMHG | OXYGEN SATURATION: 98 % | DIASTOLIC BLOOD PRESSURE: 56 MMHG | RESPIRATION RATE: 13 BRPM | RESPIRATION RATE: 17 BRPM | SYSTOLIC BLOOD PRESSURE: 100 MMHG | RESPIRATION RATE: 18 BRPM | DIASTOLIC BLOOD PRESSURE: 49 MMHG | SYSTOLIC BLOOD PRESSURE: 114 MMHG | HEART RATE: 61 BPM | HEIGHT: 63 IN | HEART RATE: 50 BPM | OXYGEN SATURATION: 98 % | SYSTOLIC BLOOD PRESSURE: 84 MMHG | DIASTOLIC BLOOD PRESSURE: 53 MMHG | SYSTOLIC BLOOD PRESSURE: 115 MMHG | HEART RATE: 48 BPM | HEART RATE: 55 BPM | DIASTOLIC BLOOD PRESSURE: 38 MMHG | HEART RATE: 55 BPM | HEART RATE: 48 BPM | DIASTOLIC BLOOD PRESSURE: 56 MMHG | DIASTOLIC BLOOD PRESSURE: 30 MMHG | RESPIRATION RATE: 16 BRPM | DIASTOLIC BLOOD PRESSURE: 32 MMHG | HEART RATE: 48 BPM | WEIGHT: 185 LBS | HEART RATE: 43 BPM | SYSTOLIC BLOOD PRESSURE: 92 MMHG | HEART RATE: 43 BPM | SYSTOLIC BLOOD PRESSURE: 116 MMHG | SYSTOLIC BLOOD PRESSURE: 117 MMHG | SYSTOLIC BLOOD PRESSURE: 116 MMHG | DIASTOLIC BLOOD PRESSURE: 42 MMHG | RESPIRATION RATE: 16 BRPM | SYSTOLIC BLOOD PRESSURE: 82 MMHG | HEART RATE: 80 BPM | TEMPERATURE: 97.7 F | SYSTOLIC BLOOD PRESSURE: 85 MMHG | SYSTOLIC BLOOD PRESSURE: 87 MMHG | OXYGEN SATURATION: 99 % | WEIGHT: 185 LBS | HEART RATE: 47 BPM | SYSTOLIC BLOOD PRESSURE: 87 MMHG | HEART RATE: 61 BPM | DIASTOLIC BLOOD PRESSURE: 42 MMHG | RESPIRATION RATE: 18 BRPM | OXYGEN SATURATION: 98 % | DIASTOLIC BLOOD PRESSURE: 49 MMHG | TEMPERATURE: 97.7 F | HEART RATE: 50 BPM | DIASTOLIC BLOOD PRESSURE: 53 MMHG | DIASTOLIC BLOOD PRESSURE: 47 MMHG | SYSTOLIC BLOOD PRESSURE: 84 MMHG | OXYGEN SATURATION: 99 % | HEART RATE: 70 BPM | TEMPERATURE: 97.7 F | RESPIRATION RATE: 13 BRPM | DIASTOLIC BLOOD PRESSURE: 42 MMHG | RESPIRATION RATE: 15 BRPM | DIASTOLIC BLOOD PRESSURE: 35 MMHG | SYSTOLIC BLOOD PRESSURE: 116 MMHG | DIASTOLIC BLOOD PRESSURE: 32 MMHG | HEART RATE: 47 BPM | HEART RATE: 49 BPM | HEART RATE: 49 BPM | SYSTOLIC BLOOD PRESSURE: 88 MMHG | DIASTOLIC BLOOD PRESSURE: 35 MMHG | RESPIRATION RATE: 14 BRPM | HEART RATE: 50 BPM

## 2023-09-06 ENCOUNTER — AMBULATORY SURGICAL CENTER (OUTPATIENT)
Dept: URBAN - METROPOLITAN AREA SURGERY 12 | Facility: SURGERY | Age: 73
End: 2023-09-06

## 2023-09-06 DIAGNOSIS — K57.30 DIVERTICULOSIS OF LARGE INTESTINE WITHOUT PERFORATION OR ABS: ICD-10-CM

## 2023-09-06 DIAGNOSIS — K64.4 RESIDUAL HEMORRHOIDAL SKIN TAGS: ICD-10-CM

## 2023-09-06 DIAGNOSIS — Z86.010 PERSONAL HISTORY OF COLONIC POLYPS: ICD-10-CM

## 2023-09-06 DIAGNOSIS — K64.8 OTHER HEMORRHOIDS: ICD-10-CM

## 2023-09-06 PROCEDURE — 45378 DIAGNOSTIC COLONOSCOPY: CPT | Performed by: INTERNAL MEDICINE

## 2023-09-12 ENCOUNTER — TELEPHONE (OUTPATIENT)
Dept: PRIMARY CARE | Facility: CLINIC | Age: 73
End: 2023-09-12
Payer: MEDICARE

## 2023-11-20 ENCOUNTER — LAB (OUTPATIENT)
Dept: LAB | Facility: LAB | Age: 73
End: 2023-11-20
Payer: MEDICARE

## 2023-11-20 DIAGNOSIS — E78.00 HYPERCHOLESTEROLEMIA: ICD-10-CM

## 2023-11-20 DIAGNOSIS — L65.9 ALOPECIA: ICD-10-CM

## 2023-11-20 DIAGNOSIS — D68.69 OTHER THROMBOPHILIA (MULTI): ICD-10-CM

## 2023-11-20 DIAGNOSIS — I73.9 PERIPHERAL VASCULAR DISEASE, UNSPECIFIED (CMS-HCC): ICD-10-CM

## 2023-11-20 DIAGNOSIS — I10 PRIMARY HYPERTENSION: ICD-10-CM

## 2023-11-20 DIAGNOSIS — M81.0 AGE-RELATED OSTEOPOROSIS WITHOUT CURRENT PATHOLOGICAL FRACTURE: ICD-10-CM

## 2023-11-20 DIAGNOSIS — I48.91 ATRIAL FIBRILLATION WITH RVR (MULTI): ICD-10-CM

## 2023-11-20 DIAGNOSIS — K21.9 GASTROESOPHAGEAL REFLUX DISEASE WITHOUT ESOPHAGITIS: ICD-10-CM

## 2023-11-20 LAB
ALBUMIN SERPL BCP-MCNC: 4.1 G/DL (ref 3.4–5)
ALP SERPL-CCNC: 56 U/L (ref 33–136)
ALT SERPL W P-5'-P-CCNC: 19 U/L (ref 7–45)
ANION GAP SERPL CALC-SCNC: 12 MMOL/L (ref 10–20)
AST SERPL W P-5'-P-CCNC: 23 U/L (ref 9–39)
BILIRUB SERPL-MCNC: 0.6 MG/DL (ref 0–1.2)
BUN SERPL-MCNC: 13 MG/DL (ref 6–23)
CALCIUM SERPL-MCNC: 10.3 MG/DL (ref 8.6–10.6)
CHLORIDE SERPL-SCNC: 103 MMOL/L (ref 98–107)
CHOLEST SERPL-MCNC: 158 MG/DL (ref 0–199)
CHOLESTEROL/HDL RATIO: 3
CO2 SERPL-SCNC: 29 MMOL/L (ref 21–32)
CREAT SERPL-MCNC: 0.93 MG/DL (ref 0.5–1.05)
ERYTHROCYTE [DISTWIDTH] IN BLOOD BY AUTOMATED COUNT: 12.9 % (ref 11.5–14.5)
GFR SERPL CREATININE-BSD FRML MDRD: 65 ML/MIN/1.73M*2
GLUCOSE SERPL-MCNC: 94 MG/DL (ref 74–99)
HCT VFR BLD AUTO: 39.9 % (ref 36–46)
HDLC SERPL-MCNC: 53.4 MG/DL
HGB BLD-MCNC: 12.6 G/DL (ref 12–16)
LDLC SERPL CALC-MCNC: 76 MG/DL
MCH RBC QN AUTO: 29.6 PG (ref 26–34)
MCHC RBC AUTO-ENTMCNC: 31.6 G/DL (ref 32–36)
MCV RBC AUTO: 94 FL (ref 80–100)
NON HDL CHOLESTEROL: 105 MG/DL (ref 0–149)
NRBC BLD-RTO: 0 /100 WBCS (ref 0–0)
PLATELET # BLD AUTO: 206 X10*3/UL (ref 150–450)
POTASSIUM SERPL-SCNC: 4.2 MMOL/L (ref 3.5–5.3)
PROT SERPL-MCNC: 7 G/DL (ref 6.4–8.2)
RBC # BLD AUTO: 4.25 X10*6/UL (ref 4–5.2)
SODIUM SERPL-SCNC: 140 MMOL/L (ref 136–145)
TRIGL SERPL-MCNC: 141 MG/DL (ref 0–149)
TSH SERPL-ACNC: 2.25 MIU/L (ref 0.44–3.98)
VLDL: 28 MG/DL (ref 0–40)
WBC # BLD AUTO: 6.5 X10*3/UL (ref 4.4–11.3)

## 2023-11-20 PROCEDURE — 82652 VIT D 1 25-DIHYDROXY: CPT

## 2023-11-20 PROCEDURE — 80053 COMPREHEN METABOLIC PANEL: CPT

## 2023-11-20 PROCEDURE — 36415 COLL VENOUS BLD VENIPUNCTURE: CPT

## 2023-11-20 PROCEDURE — 80061 LIPID PANEL: CPT

## 2023-11-20 PROCEDURE — 84443 ASSAY THYROID STIM HORMONE: CPT

## 2023-11-20 PROCEDURE — 85027 COMPLETE CBC AUTOMATED: CPT

## 2023-11-22 LAB — 1,25(OH)2D3 SERPL-MCNC: 11.7 PG/ML (ref 19.9–79.3)

## 2023-12-05 ENCOUNTER — OFFICE VISIT (OUTPATIENT)
Dept: PRIMARY CARE | Facility: CLINIC | Age: 73
End: 2023-12-05
Payer: MEDICARE

## 2023-12-05 VITALS
SYSTOLIC BLOOD PRESSURE: 126 MMHG | BODY MASS INDEX: 33.38 KG/M2 | OXYGEN SATURATION: 100 % | HEART RATE: 51 BPM | WEIGHT: 188.4 LBS | HEIGHT: 63 IN | DIASTOLIC BLOOD PRESSURE: 60 MMHG

## 2023-12-05 DIAGNOSIS — I48.91 ATRIAL FIBRILLATION WITH RVR (MULTI): Primary | ICD-10-CM

## 2023-12-05 DIAGNOSIS — E78.00 HYPERCHOLESTEROLEMIA: ICD-10-CM

## 2023-12-05 DIAGNOSIS — M81.0 AGE-RELATED OSTEOPOROSIS WITHOUT CURRENT PATHOLOGICAL FRACTURE: ICD-10-CM

## 2023-12-05 DIAGNOSIS — I10 PRIMARY HYPERTENSION: ICD-10-CM

## 2023-12-05 PROCEDURE — 1126F AMNT PAIN NOTED NONE PRSNT: CPT | Performed by: INTERNAL MEDICINE

## 2023-12-05 PROCEDURE — 3078F DIAST BP <80 MM HG: CPT | Performed by: INTERNAL MEDICINE

## 2023-12-05 PROCEDURE — 3074F SYST BP LT 130 MM HG: CPT | Performed by: INTERNAL MEDICINE

## 2023-12-05 PROCEDURE — 3008F BODY MASS INDEX DOCD: CPT | Performed by: INTERNAL MEDICINE

## 2023-12-05 PROCEDURE — 1036F TOBACCO NON-USER: CPT | Performed by: INTERNAL MEDICINE

## 2023-12-05 PROCEDURE — 1159F MED LIST DOCD IN RCRD: CPT | Performed by: INTERNAL MEDICINE

## 2023-12-05 PROCEDURE — 99214 OFFICE O/P EST MOD 30 MIN: CPT | Performed by: INTERNAL MEDICINE

## 2023-12-05 PROCEDURE — 1160F RVW MEDS BY RX/DR IN RCRD: CPT | Performed by: INTERNAL MEDICINE

## 2023-12-05 RX ORDER — CHOLECALCIFEROL (VITAMIN D3) 125 MCG
125 CAPSULE ORAL 2 TIMES DAILY
COMMUNITY

## 2023-12-05 ASSESSMENT — ENCOUNTER SYMPTOMS
CHILLS: 0
VOMITING: 0
SORE THROAT: 0
WEAKNESS: 0
FREQUENCY: 0
HEADACHES: 0
SHORTNESS OF BREATH: 0
CONSTIPATION: 0
FEVER: 0
FATIGUE: 0
LIGHT-HEADEDNESS: 0
HEMATURIA: 0
MYALGIAS: 0
DYSURIA: 0
NAUSEA: 0
DIARRHEA: 0
ARTHRALGIAS: 0
DIZZINESS: 0
COUGH: 0
PALPITATIONS: 0
DIFFICULTY URINATING: 0

## 2023-12-05 ASSESSMENT — PATIENT HEALTH QUESTIONNAIRE - PHQ9
1. LITTLE INTEREST OR PLEASURE IN DOING THINGS: NOT AT ALL
2. FEELING DOWN, DEPRESSED OR HOPELESS: NOT AT ALL
SUM OF ALL RESPONSES TO PHQ9 QUESTIONS 1 AND 2: 0

## 2023-12-05 ASSESSMENT — PAIN SCALES - GENERAL: PAINLEVEL: 0-NO PAIN

## 2023-12-05 NOTE — ASSESSMENT & PLAN NOTE
Patient's cholesterol level is good with an LDL of 75 while on pravastatin 20 mg daily.  Liver enzymes remain normal.

## 2023-12-05 NOTE — PROGRESS NOTES
"Subjective   Patient ID: Vonnie A Friend \"Cisco" is a 73 y.o. female who presents for 4 month follow up for HTN and cholesterol management.  BN    Patient is here today for 4-month follow-up for hypertension, A-fib, high cholesterol osteoporosis and medication management.  Patient initially wanted the diagnosis of osteoporosis removed from her diagnosis list.  I explained to the patient that although her bone density had improved to osteopenia she still carries a diagnosis of osteoporosis for the rest of her life.      Patient completed blood work on November 20 and it was reviewed with her in detail today.          Review of Systems   Constitutional:  Negative for chills, fatigue and fever.   HENT:  Negative for sore throat.    Eyes:  Negative for visual disturbance.   Respiratory:  Negative for cough and shortness of breath.    Cardiovascular:  Negative for chest pain, palpitations and leg swelling.   Gastrointestinal:  Negative for constipation, diarrhea, nausea and vomiting.   Genitourinary:  Negative for difficulty urinating, dysuria, frequency, hematuria and urgency.   Musculoskeletal:  Negative for arthralgias and myalgias.   Skin:  Negative for rash.   Neurological:  Negative for dizziness, syncope, weakness, light-headedness and headaches.       Objective   Medication Documentation Review Audit       Reviewed by Светлана Garcia MD (Physician) on 12/05/23 at 1141      Medication Order Taking? Sig Documenting Provider Last Dose Status   amLODIPine (Norvasc) 5 mg tablet [id number]  1 tablet (5 mg). Historical Provider, MD  Active   apixaban (Eliquis) 5 mg tablet [id number]  Take 1 tablet (5 mg) by mouth in the morning and 1 tablet (5 mg) before bedtime. Historical Provider, MD  Active   ascorbic acid (Vitamin C) 500 mg tablet [id number]  Take 1 tablet (500 mg) by mouth once daily. Historical Provider, MD  Active   azelaic acid (Finacea) 15 % gel [id number]  twice a day. Apply Sparingly and Massage in Well to Affected " Area(s) Verenice Varghese MD  Active   biotin 5,000 mcg tablet,chewable [id number]  Chew 5,000 mcg 2 times a day. Historical MD Abimael  Active   CALCIUM ORAL [id number]  Take 1 tablet by mouth in the morning, at noon, and at bedtime. Verenice Varghese MD  Active   desonide (DesOwen) 0.05 % cream [id number]  Desonide 0.05 % External Cream   Quantity: 15  Refills: 0        Start : 27-Sep-2021   Active Verenice Varghese MD  Active   diphenhydrAMINE HCL (BenadryL) 2 % gel [id number]  25 mg. Verenice Varghese MD  Active   dofetilide (Tikosyn) 500 mcg capsule [id number]  Take 1 capsule (500 mcg) by mouth in the morning and 1 capsule (500 mcg) before bedtime. Historical MD Abimael  Active   famotidine (Pepcid) 40 mg tablet [id number]  Take by mouth. Historical MD Abimael  Active   GaviLyte-G 236-22.74-6.74 -5.86 gram solution [id number]   Historical MD Abimael  Active   glucosamine/chondro howell A/C/Mn (GLUCOSAMINE-CHONDROITIN COMPLX ORAL) [id number]  Take 1 capsule by mouth in the morning and at bedtime. Verenice Varghese MD  Active   ketoconazole (NIZOral) 2 % shampoo [id number]  Ketoconazole 2 % External Shampoo   Quantity: 360  Refills: 0        Start : 8-Dec-2021   Active Verenice Varghese MD  Active   losartan (Cozaar) 100 mg tablet [id number]  Take 1 tablet (100 mg) by mouth once daily. Historical MD Abimael  Active   metoprolol succinate XL (Toprol-XL) 25 mg 24 hr tablet [id number]  Take by mouth. Alternate 1 whole tablet (25mg) per night with 1/2 tablet per night per Dr Cosme's office 12/12/22 Verenice Varghese MD  Active   MINOXIDIL TOP [id number]  Minoxidil SOLN   Refills: 0       Active Verenice Varghese MD  Active   MULTIVITAMIN ORAL [id number]  Take 1 tablet by mouth 1 (one) time each day. Verenice Varghese MD  Active   omega 3-dha-epa-fish oil 1,000 mg (250 mg-750 mg)/5 mL liquid [id number]  Take 1,000 mcg by mouth 2 times a day. Historical MD Abimael  Active   pravastatin (Pravachol) 20 mg tablet [id number]  Take  "1 tablet (20 mg) by mouth once daily. Historical Provider, MD  Active                  Allergies   Allergen Reactions    Lisinopril Cough    Oxycodone-Acetaminophen Unknown     Physical Exam  Constitutional:       Appearance: Normal appearance. She is obese.   HENT:      Head: Normocephalic and atraumatic.      Nose: Nose normal.   Eyes:      Extraocular Movements: Extraocular movements intact.      Pupils: Pupils are equal, round, and reactive to light.   Cardiovascular:      Rate and Rhythm: Normal rate and regular rhythm.   Pulmonary:      Breath sounds: Normal breath sounds.   Abdominal:      General: Abdomen is flat. Bowel sounds are normal.      Palpations: Abdomen is soft.   Musculoskeletal:      Right lower leg: No edema.      Left lower leg: No edema.   Neurological:      Mental Status: She is alert.     /60   Pulse 51   Ht 1.6 m (5' 3\")   Wt 85.5 kg (188 lb 6.4 oz)   SpO2 100%   BMI 33.37 kg/m²       Assessment/Plan   Problem List Items Addressed This Visit       Atrial fibrillation with RVR (CMS/[facility]) - Primary    Hypercholesterolemia     Patient's cholesterol level is good with an LDL of 75 while on pravastatin 20 mg daily.  Liver enzymes remain normal.         Hypertension     Hypertension is stable and well-controlled.         Osteoporosis              It has been a pleasure seeing you.  Светлана Garcia MD    "

## 2023-12-11 DIAGNOSIS — E78.00 HYPERCHOLESTEROLEMIA: ICD-10-CM

## 2023-12-11 RX ORDER — PRAVASTATIN SODIUM 20 MG/1
20 TABLET ORAL DAILY
Qty: 90 TABLET | Refills: 0 | Status: SHIPPED | OUTPATIENT
Start: 2023-12-11 | End: 2024-03-20 | Stop reason: SDUPTHER

## 2024-01-10 ENCOUNTER — TELEPHONE (OUTPATIENT)
Dept: PRIMARY CARE | Facility: CLINIC | Age: 74
End: 2024-01-10
Payer: MEDICARE

## 2024-01-10 NOTE — TELEPHONE ENCOUNTER
"Pt called stating she feels \"off\" she felt light headed and has h/o syncope. She denies any nausea or vomiting. No fever. She just felt flushed. Her BP was 112/55 pulse 54. Please advise.  "

## 2024-03-20 ENCOUNTER — OFFICE VISIT (OUTPATIENT)
Dept: PRIMARY CARE | Facility: CLINIC | Age: 74
End: 2024-03-20
Payer: MEDICARE

## 2024-03-20 VITALS
HEIGHT: 63 IN | DIASTOLIC BLOOD PRESSURE: 60 MMHG | OXYGEN SATURATION: 100 % | BODY MASS INDEX: 33.52 KG/M2 | WEIGHT: 189.2 LBS | SYSTOLIC BLOOD PRESSURE: 138 MMHG | HEART RATE: 45 BPM

## 2024-03-20 DIAGNOSIS — Z13.39 ALCOHOL SCREENING: ICD-10-CM

## 2024-03-20 DIAGNOSIS — I48.91 ATRIAL FIBRILLATION WITH RVR (MULTI): ICD-10-CM

## 2024-03-20 DIAGNOSIS — I10 PRIMARY HYPERTENSION: ICD-10-CM

## 2024-03-20 DIAGNOSIS — E78.00 HYPERCHOLESTEROLEMIA: ICD-10-CM

## 2024-03-20 DIAGNOSIS — Z78.9 FULL CODE STATUS: ICD-10-CM

## 2024-03-20 DIAGNOSIS — D68.69 OTHER THROMBOPHILIA (MULTI): ICD-10-CM

## 2024-03-20 DIAGNOSIS — Z13.89 SCREENING FOR MULTIPLE CONDITIONS: ICD-10-CM

## 2024-03-20 DIAGNOSIS — Z00.00 WELLNESS EXAMINATION: ICD-10-CM

## 2024-03-20 DIAGNOSIS — I73.9 PERIPHERAL VASCULAR DISEASE, UNSPECIFIED (CMS-HCC): ICD-10-CM

## 2024-03-20 DIAGNOSIS — Z12.31 SCREENING MAMMOGRAM FOR BREAST CANCER: ICD-10-CM

## 2024-03-20 DIAGNOSIS — E66.01 MORBID (SEVERE) OBESITY DUE TO EXCESS CALORIES (MULTI): ICD-10-CM

## 2024-03-20 DIAGNOSIS — Z00.00 ROUTINE GENERAL MEDICAL EXAMINATION AT HEALTH CARE FACILITY: Primary | ICD-10-CM

## 2024-03-20 PROBLEM — L72.3 SEBACEOUS CYST: Status: RESOLVED | Noted: 2023-01-25 | Resolved: 2024-03-20

## 2024-03-20 PROCEDURE — 1160F RVW MEDS BY RX/DR IN RCRD: CPT | Performed by: INTERNAL MEDICINE

## 2024-03-20 PROCEDURE — 99214 OFFICE O/P EST MOD 30 MIN: CPT | Performed by: INTERNAL MEDICINE

## 2024-03-20 PROCEDURE — 3075F SYST BP GE 130 - 139MM HG: CPT | Performed by: INTERNAL MEDICINE

## 2024-03-20 PROCEDURE — 1157F ADVNC CARE PLAN IN RCRD: CPT | Performed by: INTERNAL MEDICINE

## 2024-03-20 PROCEDURE — 1036F TOBACCO NON-USER: CPT | Performed by: INTERNAL MEDICINE

## 2024-03-20 PROCEDURE — 1170F FXNL STATUS ASSESSED: CPT | Performed by: INTERNAL MEDICINE

## 2024-03-20 PROCEDURE — 3078F DIAST BP <80 MM HG: CPT | Performed by: INTERNAL MEDICINE

## 2024-03-20 PROCEDURE — 1159F MED LIST DOCD IN RCRD: CPT | Performed by: INTERNAL MEDICINE

## 2024-03-20 PROCEDURE — 1126F AMNT PAIN NOTED NONE PRSNT: CPT | Performed by: INTERNAL MEDICINE

## 2024-03-20 PROCEDURE — G0442 ANNUAL ALCOHOL SCREEN 15 MIN: HCPCS | Performed by: INTERNAL MEDICINE

## 2024-03-20 PROCEDURE — G0439 PPPS, SUBSEQ VISIT: HCPCS | Performed by: INTERNAL MEDICINE

## 2024-03-20 RX ORDER — PRAVASTATIN SODIUM 20 MG/1
20 TABLET ORAL DAILY
Qty: 90 TABLET | Refills: 3 | Status: SHIPPED | OUTPATIENT
Start: 2024-03-20

## 2024-03-20 SDOH — ECONOMIC STABILITY: INCOME INSECURITY: IN THE LAST 12 MONTHS, WAS THERE A TIME WHEN YOU WERE NOT ABLE TO PAY THE MORTGAGE OR RENT ON TIME?: NO

## 2024-03-20 SDOH — ECONOMIC STABILITY: GENERAL
WHICH OF THE FOLLOWING WOULD YOU LIKE TO GET CONNECTED TO IN ORDER TO RECEIVE A DISCOUNT OR FOR FREE? (CHOOSE ALL THAT APPLY): NONE OF THESE

## 2024-03-20 SDOH — HEALTH STABILITY: PHYSICAL HEALTH: ON AVERAGE, HOW MANY DAYS PER WEEK DO YOU ENGAGE IN MODERATE TO STRENUOUS EXERCISE (LIKE A BRISK WALK)?: 2 DAYS

## 2024-03-20 SDOH — ECONOMIC STABILITY: FOOD INSECURITY: WITHIN THE PAST 12 MONTHS, YOU WORRIED THAT YOUR FOOD WOULD RUN OUT BEFORE YOU GOT MONEY TO BUY MORE.: NEVER TRUE

## 2024-03-20 SDOH — ECONOMIC STABILITY: FOOD INSECURITY: WITHIN THE PAST 12 MONTHS, THE FOOD YOU BOUGHT JUST DIDN'T LAST AND YOU DIDN'T HAVE MONEY TO GET MORE.: NEVER TRUE

## 2024-03-20 SDOH — ECONOMIC STABILITY: HOUSING INSECURITY: IN THE LAST 12 MONTHS, HOW MANY PLACES HAVE YOU LIVED?: 1

## 2024-03-20 SDOH — ECONOMIC STABILITY: GENERAL
WHICH OF THE FOLLOWING DO YOU KNOW HOW TO USE AND HAVE ACCESS TO EVERY DAY? (CHOOSE ALL THAT APPLY): DESKTOP COMPUTER, LAPTOP COMPUTER, OR TABLET WITH BROADBAND INTERNET CONNECTION;SMARTPHONE WITH CELLULAR DATA PLAN

## 2024-03-20 SDOH — HEALTH STABILITY: PHYSICAL HEALTH: ON AVERAGE, HOW MANY MINUTES DO YOU ENGAGE IN EXERCISE AT THIS LEVEL?: 20 MIN

## 2024-03-20 ASSESSMENT — SOCIAL DETERMINANTS OF HEALTH (SDOH)
ARE YOU MARRIED, WIDOWED, DIVORCED, SEPARATED, NEVER MARRIED, OR LIVING WITH A PARTNER?: NEVER MARRIED
IN THE PAST 12 MONTHS, HAS THE ELECTRIC, GAS, OIL, OR WATER COMPANY THREATENED TO SHUT OFF SERVICE IN YOUR HOME?: NO
HOW OFTEN DO YOU GET TOGETHER WITH FRIENDS OR RELATIVES?: TWICE A WEEK
IN A TYPICAL WEEK, HOW MANY TIMES DO YOU TALK ON THE PHONE WITH FAMILY, FRIENDS, OR NEIGHBORS?: MORE THAN THREE TIMES A WEEK
WITHIN THE LAST YEAR, HAVE YOU BEEN KICKED, HIT, SLAPPED, OR OTHERWISE PHYSICALLY HURT BY YOUR PARTNER OR EX-PARTNER?: NO
DO YOU BELONG TO ANY CLUBS OR ORGANIZATIONS SUCH AS CHURCH GROUPS UNIONS, FRATERNAL OR ATHLETIC GROUPS, OR SCHOOL GROUPS?: NO
HOW OFTEN DO YOU ATTEND CHURCH OR RELIGIOUS SERVICES?: NEVER
HOW OFTEN DO YOU ATTENT MEETINGS OF THE CLUB OR ORGANIZATION YOU BELONG TO?: NEVER
WITHIN THE LAST YEAR, HAVE YOU BEEN AFRAID OF YOUR PARTNER OR EX-PARTNER?: NO
WITHIN THE LAST YEAR, HAVE YOU BEEN HUMILIATED OR EMOTIONALLY ABUSED IN OTHER WAYS BY YOUR PARTNER OR EX-PARTNER?: NO
HOW HARD IS IT FOR YOU TO PAY FOR THE VERY BASICS LIKE FOOD, HOUSING, MEDICAL CARE, AND HEATING?: NOT HARD AT ALL
WITHIN THE LAST YEAR, HAVE TO BEEN RAPED OR FORCED TO HAVE ANY KIND OF SEXUAL ACTIVITY BY YOUR PARTNER OR EX-PARTNER?: NO

## 2024-03-20 ASSESSMENT — LIFESTYLE VARIABLES
HOW OFTEN DO YOU HAVE A DRINK CONTAINING ALCOHOL: MONTHLY OR LESS
HOW OFTEN DO YOU HAVE SIX OR MORE DRINKS ON ONE OCCASION: NEVER
HOW MANY STANDARD DRINKS CONTAINING ALCOHOL DO YOU HAVE ON A TYPICAL DAY: 1 OR 2
AUDIT-C TOTAL SCORE: 1
SKIP TO QUESTIONS 9-10: 1

## 2024-03-20 ASSESSMENT — ENCOUNTER SYMPTOMS
VOMITING: 0
FREQUENCY: 0
DIARRHEA: 0
SHORTNESS OF BREATH: 0
CHILLS: 0
HEADACHES: 0
DYSURIA: 0
DIZZINESS: 0
WEAKNESS: 0
FEVER: 0
LIGHT-HEADEDNESS: 0
COUGH: 0
NAUSEA: 0
PALPITATIONS: 0
DIFFICULTY URINATING: 0
HEMATURIA: 0
SORE THROAT: 0
MYALGIAS: 0
ARTHRALGIAS: 0
CONSTIPATION: 0
FATIGUE: 0

## 2024-03-20 ASSESSMENT — PAIN SCALES - GENERAL: PAINLEVEL: 0-NO PAIN

## 2024-03-20 ASSESSMENT — ACTIVITIES OF DAILY LIVING (ADL)
EATING: INDEPENDENT
FEEDING YOURSELF: INDEPENDENT
USING TELEPHONE: INDEPENDENT
PILL BOX USED: YES
TOILETING: INDEPENDENT
ADEQUATE_TO_COMPLETE_ADL: YES
GROOMING: INDEPENDENT
JUDGMENT_ADEQUATE_SAFELY_COMPLETE_DAILY_ACTIVITIES: YES
TAKING MEDICATION: INDEPENDENT
JUDGMENT_ADEQUATE_SAFELY_COMPLETE_DAILY_ACTIVITIES: YES
BATHING: INDEPENDENT
STIL DRIVING: YES
HEARING - RIGHT EAR: FUNCTIONAL
TOILETING: INDEPENDENT
WALKS IN HOME: INDEPENDENT
USING TRANSPORTATION: INDEPENDENT
PREPARING MEALS: INDEPENDENT
PATIENT'S MEMORY ADEQUATE TO SAFELY COMPLETE DAILY ACTIVITIES?: YES
HEARING - LEFT EAR: FUNCTIONAL
ADEQUATE_TO_COMPLETE_ADL: YES
BATHING: INDEPENDENT
DRESSING: INDEPENDENT
MANAGING FINANCES: INDEPENDENT
DRESSING YOURSELF: INDEPENDENT
NEEDS ASSISTANCE WITH FOOD: INDEPENDENT
FEEDING: INDEPENDENT
DOING HOUSEWORK: INDEPENDENT
GROCERY SHOPPING: INDEPENDENT

## 2024-03-20 ASSESSMENT — PATIENT HEALTH QUESTIONNAIRE - PHQ9
2. FEELING DOWN, DEPRESSED OR HOPELESS: NOT AT ALL
1. LITTLE INTEREST OR PLEASURE IN DOING THINGS: NOT AT ALL
SUM OF ALL RESPONSES TO PHQ9 QUESTIONS 1 AND 2: 0

## 2024-03-20 NOTE — PROGRESS NOTES
"Subjective   Reason for Visit: Vonnie SALAS Friend is an 73 y.o. female here for a Medicare Wellness visit.     Past Medical, Surgical, and Family History reviewed and updated in chart.    Reviewed all medications by prescribing practitioner or clinical pharmacist (such as prescriptions, OTCs, herbal therapies and supplements) and documented in the medical record.    Patient is here today for annual wellness visit as well as management of her medical problems including peripheral vascular disease, hypertension high cholesterol and A-fib.  Overall she feels well and has no new complaints.  She continues to follow-up with Dr. Cosme her cardiologist.  We also discussed CODE STATUS today and she would like to be a full code.  She does have a power of  on record with her and it is first sister Maral Orozco.        Patient Care Team:  Светлана Garcia MD as PCP - General  Светлана Garcia MD as PCP - [employer] PCP     Review of Systems   Constitutional:  Negative for chills, fatigue and fever.   HENT:  Negative for sore throat.    Eyes:  Negative for visual disturbance.   Respiratory:  Negative for cough and shortness of breath.    Cardiovascular:  Negative for chest pain, palpitations and leg swelling.   Gastrointestinal:  Negative for constipation, diarrhea, nausea and vomiting.   Genitourinary:  Negative for difficulty urinating, dysuria, frequency, hematuria and urgency.   Musculoskeletal:  Negative for arthralgias and myalgias.   Skin:  Negative for rash.   Neurological:  Negative for dizziness, syncope, weakness, light-headedness and headaches.       Objective   Vitals:  /60   Pulse (!) 45   Ht 1.6 m (5' 3\")   Wt 85.8 kg (189 lb 3.2 oz)   SpO2 100%   BMI 33.52 kg/m²       Physical Exam  Constitutional:       Appearance: Normal appearance. She is obese.   HENT:      Head: Normocephalic and atraumatic.      Nose: Nose normal.   Eyes:      Extraocular Movements: Extraocular movements " intact.      Pupils: Pupils are equal, round, and reactive to light.   Cardiovascular:      Rate and Rhythm: Normal rate and regular rhythm.   Pulmonary:      Breath sounds: Normal breath sounds.   Abdominal:      General: Abdomen is flat. Bowel sounds are normal.      Palpations: Abdomen is soft.   Musculoskeletal:      Right lower leg: No edema.      Left lower leg: No edema.   Neurological:      Mental Status: She is alert.         Assessment/Plan   Problem List Items Addressed This Visit       Atrial fibrillation with RVR (CMS/HCC)    Hypercholesterolemia    Relevant Medications    pravastatin (Pravachol) 20 mg tablet    Peripheral vascular disease, unspecified (CMS/HCC)    Other thrombophilia (CMS/HCC)    Morbid (severe) obesity due to excess calories (CMS/HCC)     Other Visit Diagnoses       Routine general medical examination at health care facility    -  Primary    Screening mammogram for breast cancer        Relevant Orders    BI mammo bilateral screening tomosynthesis

## 2024-03-20 NOTE — ASSESSMENT & PLAN NOTE
PRE-OP INSTRUCTIONS FOR THE SURGICAL PATIENT YOU ARE UNABLE TO MAKE CONTACT FOR AN INTERVIEW:      1. Follow instructions for your ARRIVAL TIME as DIRECTED BY YOUR SURGEON. 2. Enter the MAIN entrance located on [employer] [address] and report to the desk. 3. Bring your insurance & prescription card and photo ID with you. You may also be asked to pay a co-pay, as you may want to bring a check or credit card with you. 4. Leave all other valuables at home. 5. Arrange for someone to drive you home and be with you for the first 24 hours after discharge. 6. You must contact your surgeon for ALL medication instructions, especially if taking blood thinners, aspirin, or diabetic medication. 7. A Pre-op History and Physical for surgery MUST be completed by your Physician or an Urgent Care within 30 days of your procedure date. Please bring a copy with you on the day of your procedure and along with any other testing performed. 8. DO NOT EAT ANYTHING eight hours prior to surgery. May have up to 8 ounces of water 4 hours prior to surgery. 9. No gum, candy, mints, or ice chips day of procedure. 10. Please refrain from drinking alcohol the day before or day of your procedure. 11. Please do not smoke the day of your procedure. 12. Dress in loose, comfortable clothing appropriate for redressing after your procedure. Do not wear jewelry (including body piercings), make-up, fingernail polish, lotion, powders or metal hairclips. 15. Contacts will need to be removed prior to surgery. You may want to bring your            Eye glasses to wear immediately before and after surgery. 14. Dentures will need to be removed before your procedure. 13. Bring cases for your glasses, contacts, dentures, or hearing aids to protect them while you are in surgery. 16. If you use a CPAP, please bring it with you on the day of your procedure.   17. Do not shave or wax for 72 hours prior to procedure near your Patient was counseled about low-fat diet and caloric restriction as well as increasing exercise and fluids to help with weight management.

## 2024-03-20 NOTE — ASSESSMENT & PLAN NOTE
Patient remains anticoagulated with Eliquis and is rate controlled.  In fact her rate is very low only in the 40s but she is asymptomatic.  She continues follow-up with Dr. Cosme her cardiologist

## 2024-03-20 NOTE — ASSESSMENT & PLAN NOTE
Annual wellness visit completed.  Safety issues reviewed in the home and there were none found.  Patient remains independent in all her ADLs.  CODE STATUS was updated to reflect full code and living will and power of  are on record.    Patient was given a requisition for mammogram due in July  Annual labs due in November  Bone density is not due until next year

## 2024-03-20 NOTE — ASSESSMENT & PLAN NOTE
Patient is due for annual blood work in November and remains stable and controlled on pravastatin 20 mg daily

## 2024-03-20 NOTE — ASSESSMENT & PLAN NOTE
CODE STATUS was discussed with the patient and I explained the difference between a DNR comfort care, DNR Comfort Care arrest and full code.  Patient wishes to be a full code.    Patient also has a power of  and living will on record with us.  Her power of  is first her sister Maral Orozco, followed by her second Sister Barbara Rosa, and third is her friend Dahianastephanie Bain Clint.

## 2024-03-20 NOTE — PATIENT INSTRUCTIONS
Consider DNR Comfort care Arrest for your mom      Get mammogram after 7/28/24    Follow up Dr Garcia in 4 months for HTN chol etc  30 min appt

## 2024-03-20 NOTE — ASSESSMENT & PLAN NOTE
Blood pressure is mildly elevated at 138/60 today but she states at home it is usually in the 120s over 50s to 60s.

## 2024-07-31 ENCOUNTER — HOSPITAL ENCOUNTER (OUTPATIENT)
Dept: RADIOLOGY | Facility: CLINIC | Age: 74
Discharge: HOME | End: 2024-07-31
Payer: MEDICARE

## 2024-07-31 VITALS — HEIGHT: 63 IN | WEIGHT: 180 LBS | BODY MASS INDEX: 31.89 KG/M2

## 2024-07-31 DIAGNOSIS — Z12.31 SCREENING MAMMOGRAM FOR BREAST CANCER: ICD-10-CM

## 2024-07-31 PROCEDURE — 77067 SCR MAMMO BI INCL CAD: CPT

## 2024-07-31 PROCEDURE — 77067 SCR MAMMO BI INCL CAD: CPT | Performed by: RADIOLOGY

## 2024-07-31 PROCEDURE — 77063 BREAST TOMOSYNTHESIS BI: CPT | Performed by: RADIOLOGY

## 2024-08-02 ENCOUNTER — APPOINTMENT (OUTPATIENT)
Dept: PRIMARY CARE | Facility: CLINIC | Age: 74
End: 2024-08-02
Payer: MEDICARE

## 2024-08-02 VITALS
DIASTOLIC BLOOD PRESSURE: 60 MMHG | BODY MASS INDEX: 32.64 KG/M2 | HEIGHT: 63 IN | OXYGEN SATURATION: 95 % | WEIGHT: 184.2 LBS | HEART RATE: 43 BPM | SYSTOLIC BLOOD PRESSURE: 126 MMHG

## 2024-08-02 DIAGNOSIS — G89.29 CHRONIC MIDLINE LOW BACK PAIN WITHOUT SCIATICA: ICD-10-CM

## 2024-08-02 DIAGNOSIS — K22.70 BARRETT'S ESOPHAGUS WITHOUT DYSPLASIA: ICD-10-CM

## 2024-08-02 DIAGNOSIS — I48.91 ATRIAL FIBRILLATION WITH RVR (MULTI): Primary | ICD-10-CM

## 2024-08-02 DIAGNOSIS — M81.0 AGE-RELATED OSTEOPOROSIS WITHOUT CURRENT PATHOLOGICAL FRACTURE: ICD-10-CM

## 2024-08-02 DIAGNOSIS — M54.50 CHRONIC MIDLINE LOW BACK PAIN WITHOUT SCIATICA: ICD-10-CM

## 2024-08-02 DIAGNOSIS — K21.9 GASTROESOPHAGEAL REFLUX DISEASE WITHOUT ESOPHAGITIS: ICD-10-CM

## 2024-08-02 DIAGNOSIS — E78.00 HYPERCHOLESTEROLEMIA: ICD-10-CM

## 2024-08-02 DIAGNOSIS — I10 PRIMARY HYPERTENSION: ICD-10-CM

## 2024-08-02 DIAGNOSIS — E66.01 MORBID (SEVERE) OBESITY DUE TO EXCESS CALORIES (MULTI): ICD-10-CM

## 2024-08-02 PROBLEM — M85.80 OSTEOPENIA: Status: RESOLVED | Noted: 2023-01-25 | Resolved: 2024-08-02

## 2024-08-02 PROBLEM — M25.562 ACUTE PAIN OF BOTH KNEES: Status: RESOLVED | Noted: 2023-01-25 | Resolved: 2024-08-02

## 2024-08-02 PROBLEM — M25.561 ACUTE PAIN OF BOTH KNEES: Status: RESOLVED | Noted: 2023-01-25 | Resolved: 2024-08-02

## 2024-08-02 PROCEDURE — 3078F DIAST BP <80 MM HG: CPT | Performed by: INTERNAL MEDICINE

## 2024-08-02 PROCEDURE — 3008F BODY MASS INDEX DOCD: CPT | Performed by: INTERNAL MEDICINE

## 2024-08-02 PROCEDURE — 1157F ADVNC CARE PLAN IN RCRD: CPT | Performed by: INTERNAL MEDICINE

## 2024-08-02 PROCEDURE — 1159F MED LIST DOCD IN RCRD: CPT | Performed by: INTERNAL MEDICINE

## 2024-08-02 PROCEDURE — 1160F RVW MEDS BY RX/DR IN RCRD: CPT | Performed by: INTERNAL MEDICINE

## 2024-08-02 PROCEDURE — 3074F SYST BP LT 130 MM HG: CPT | Performed by: INTERNAL MEDICINE

## 2024-08-02 PROCEDURE — 1126F AMNT PAIN NOTED NONE PRSNT: CPT | Performed by: INTERNAL MEDICINE

## 2024-08-02 PROCEDURE — 1036F TOBACCO NON-USER: CPT | Performed by: INTERNAL MEDICINE

## 2024-08-02 PROCEDURE — G2211 COMPLEX E/M VISIT ADD ON: HCPCS | Performed by: INTERNAL MEDICINE

## 2024-08-02 PROCEDURE — 99214 OFFICE O/P EST MOD 30 MIN: CPT | Performed by: INTERNAL MEDICINE

## 2024-08-02 ASSESSMENT — ENCOUNTER SYMPTOMS
FEVER: 0
ABDOMINAL PAIN: 0
FATIGUE: 0
FREQUENCY: 0
VOMITING: 0
LIGHT-HEADEDNESS: 0
COUGH: 0
PALPITATIONS: 0
SHORTNESS OF BREATH: 0
SORE THROAT: 0
NAUSEA: 0
DIZZINESS: 0
CONSTIPATION: 0
DIARRHEA: 0
ARTHRALGIAS: 0
DYSURIA: 0
TROUBLE SWALLOWING: 0

## 2024-08-02 ASSESSMENT — PAIN SCALES - GENERAL: PAINLEVEL: 0-NO PAIN

## 2024-08-02 NOTE — ASSESSMENT & PLAN NOTE
Repeat annual labs are due before next appointment in November so she was given a requisition today

## 2024-08-02 NOTE — PROGRESS NOTES
"Subjective   Patient ID: Vonnie A Friend \"Cisco" is a 73 y.o. female who presents for cholesterol and HTN management.    Patient is here for routine follow-up on A-fib high cholesterol hypertension osteoporosis.  Patient did have a few questions which were answered today but overall she is feeling well.  She has not really had any alcohol since she had A-fib and wanted to know the date of her A-fib when it initially began but I am unsure of that as it was before her current medical records and system more started.  Patient will check with her cardiologist Dr. Cosme    Patient wanted to know if there is something she can take for her back other than Tylenol.  She is on blood thinners and it is recommended she avoid NSAIDs.  I did explain that to arthritis strength Tylenol scheduled twice a day works better than periodic Tylenol as needed.  She will give this a try to see if it helps with her back pain.  Most of her back pain is related to standing over kitchen counters or bending for long periods of time.        Review of Systems   Constitutional:  Negative for fatigue and fever.   HENT:  Negative for sore throat and trouble swallowing.    Eyes:  Negative for visual disturbance.   Respiratory:  Negative for cough and shortness of breath.    Cardiovascular:  Negative for chest pain, palpitations and leg swelling.   Gastrointestinal:  Negative for abdominal pain, constipation, diarrhea, nausea and vomiting.   Genitourinary:  Negative for dysuria and frequency.   Musculoskeletal:  Negative for arthralgias.   Skin:  Negative for rash.   Neurological:  Negative for dizziness and light-headedness.       Objective   Medication Documentation Review Audit       Reviewed by Светлана Garcia MD (Physician) on 08/02/24 at 1148      Medication Order Taking? Sig Documenting Provider Last Dose Status   amLODIPine (Norvasc) 5 mg tablet [id number] No 1 tablet (5 mg). Historical Provider, MD Taking Active   apixaban (Eliquis) 5 mg tablet " [id number] No Take 1 tablet (5 mg) by mouth in the morning and 1 tablet (5 mg) before bedtime. Historical MD Abimael Taking Active   ascorbic acid (Vitamin C) 500 mg tablet [id number] No Take 1 tablet (500 mg) by mouth once daily. Verenice Varghese MD Taking Active   azelaic acid (Finacea) 15 % gel [id number] No twice a day. Apply Sparingly and Massage in Well to Affected Area(s) Historical MD Abimael Taking Active   biotin 5,000 mcg tablet,chewable [id number]  Chew 5,000 mcg 2 times a day. Historical MD Abimael  Active   CALCIUM ORAL [id number] No Take 1 tablet by mouth in the morning, at noon, and at bedtime. Verenice Varghese MD Taking Active   cholecalciferol (Vitamin D-3) 125 MCG (5000 UT) capsule [id number]  Take 1 capsule (125 mcg) by mouth 2 times a day. Verenice Varghese MD  Active   desonide (DesOwen) 0.05 % cream [id number] No Desonide 0.05 % External Cream   Quantity: 15  Refills: 0        Start : 27-Sep-2021   Active Historical MD Abimael Taking Active   diphenhydrAMINE HCL (BenadryL) 2 % gel [id number] No 25 mg. Verenice Varghese MD Taking Active   dofetilide (Tikosyn) 500 mcg capsule [id number] No Take 1 capsule (500 mcg) by mouth in the morning and 1 capsule (500 mcg) before bedtime. Verenice Varghese MD Taking Active   famotidine (Pepcid) 40 mg tablet [id number] No Take by mouth. Verenice Varghese MD Taking Active   GaviLyte-G 236-22.74-6.74 -5.86 gram solution [id number]   Verenice Varghese MD  Active   glucosamine/chondro howell A/C/Mn (GLUCOSAMINE-CHONDROITIN COMPLX ORAL) [id number] No Take 1 capsule by mouth in the morning and at bedtime. Verenice Varghese MD Taking Active   ketoconazole (NIZOral) 2 % shampoo [id number] No Ketoconazole 2 % External Shampoo   Quantity: 360  Refills: 0        Start : 8-Dec-2021   Active Verenice Varghese MD Taking Active   losartan (Cozaar) 100 mg tablet [id number] No Take 1 tablet (100 mg) by mouth once daily. Historical MD Abimael Taking Active   metoprolol  "succinate XL (Toprol-XL) 25 mg 24 hr tablet [id number] No Take by mouth. Alternate 1 whole tablet (25mg) per night with 1/2 tablet per night per Dr Cosme's office 12/12/22 Historical Provider, MD Taking Active   MINOXIDIL TOP [id number] No Minoxidil SOLN   Refills: 0       Active Historical Provider, MD Taking Active   MULTIVITAMIN ORAL [id number] No Take 1 tablet by mouth 1 (one) time each day. Historical Provider, MD Taking Active   omega 3-dha-epa-fish oil 1,000 mg (250 mg-750 mg)/5 mL liquid [id number]  Take 1,000 mcg by mouth 2 times a day. Historical Provider, MD  Active   pravastatin (Pravachol) 20 mg tablet [id number]  Take 1 tablet (20 mg) by mouth once daily. Светлана Garcia MD  Active                  Allergies   Allergen Reactions    Lisinopril Cough    Oxycodone-Acetaminophen Unknown       /60   Pulse (!) 43   Ht 1.6 m (5' 3\")   Wt 83.6 kg (184 lb 3.2 oz)   SpO2 95%   BMI 32.63 kg/m²     Physical Exam  Constitutional:       Appearance: Normal appearance. She is obese.   HENT:      Head: Normocephalic and atraumatic.      Nose: Nose normal.   Eyes:      Extraocular Movements: Extraocular movements intact.      Pupils: Pupils are equal, round, and reactive to light.   Cardiovascular:      Rate and Rhythm: Normal rate and regular rhythm.   Pulmonary:      Breath sounds: Normal breath sounds.   Abdominal:      General: Abdomen is flat. Bowel sounds are normal.      Palpations: Abdomen is soft.   Musculoskeletal:      Right lower leg: No edema.      Left lower leg: No edema.   Neurological:      Mental Status: She is alert.           Assessment/Plan   Problem List Items Addressed This Visit       Atrial fibrillation with RVR (Multi) - Primary     Patient remains on blood thinners with Eliquis but is stable at this time and continues follow-up with her cardiologist Dr. Cosme         Relevant Orders    TSH with reflex to Free T4 if abnormal    Vitamin D 25-Hydroxy,Total (for eval of Vitamin D levels)    Lipid Panel "    Comprehensive Metabolic Panel    CBC    Ferrera's esophagus    Esophageal reflux    Hypercholesterolemia     Repeat annual labs are due before next appointment in November so she was given a requisition today         Relevant Orders    TSH with reflex to Free T4 if abnormal    Vitamin D 25-Hydroxy,Total (for eval of Vitamin D levels)    Lipid Panel    Comprehensive Metabolic Panel    CBC    Hypertension     Initial blood pressure was a little high but recheck was 126/60 stable and well-controlled.         Relevant Orders    TSH with reflex to Free T4 if abnormal    Vitamin D 25-Hydroxy,Total (for eval of Vitamin D levels)    Lipid Panel    Comprehensive Metabolic Panel    CBC    Osteoporosis     Patient's bone density was July 2023 and is due next summer.         Relevant Orders    TSH with reflex to Free T4 if abnormal    Vitamin D 25-Hydroxy,Total (for eval of Vitamin D levels)    Lipid Panel    Comprehensive Metabolic Panel    CBC    Morbid (severe) obesity due to excess calories (Multi)    Relevant Orders    TSH with reflex to Free T4 if abnormal    Vitamin D 25-Hydroxy,Total (for eval of Vitamin D levels)    Lipid Panel    Comprehensive Metabolic Panel    CBC    Chronic midline low back pain without sciatica              It has been a pleasure seeing you.  Светлана Garcia MD

## 2024-08-02 NOTE — ASSESSMENT & PLAN NOTE
Patient remains on blood thinners with Eliquis but is stable at this time and continues follow-up with her cardiologist Dr. Cosme

## 2024-10-07 ENCOUNTER — TELEPHONE (OUTPATIENT)
Dept: PRIMARY CARE | Facility: CLINIC | Age: 74
End: 2024-10-07
Payer: MEDICARE

## 2024-10-07 NOTE — TELEPHONE ENCOUNTER
Pt calling with two questions.  The Vitamin D 10,000 units is to be taken daily or weekly?  Also, she is to have blood work next month and is asking if she should stop her CoQ 10 before hand and if so how long before her blood work?

## 2024-10-21 ENCOUNTER — TELEPHONE (OUTPATIENT)
Dept: PRIMARY CARE | Facility: CLINIC | Age: 74
End: 2024-10-21
Payer: MEDICARE

## 2024-10-21 NOTE — TELEPHONE ENCOUNTER
This can wait until Wednesday FYI    Patient called to see if she had got her RSV shot and when    The chart show she received it (9/21/23) and this information was given to the patient

## 2024-11-25 ENCOUNTER — LAB (OUTPATIENT)
Dept: LAB | Facility: LAB | Age: 74
End: 2024-11-25
Payer: MEDICARE

## 2024-11-25 DIAGNOSIS — M81.0 AGE-RELATED OSTEOPOROSIS WITHOUT CURRENT PATHOLOGICAL FRACTURE: ICD-10-CM

## 2024-11-25 DIAGNOSIS — E78.00 HYPERCHOLESTEROLEMIA: ICD-10-CM

## 2024-11-25 DIAGNOSIS — E66.01 MORBID (SEVERE) OBESITY DUE TO EXCESS CALORIES (MULTI): ICD-10-CM

## 2024-11-25 DIAGNOSIS — I48.91 ATRIAL FIBRILLATION WITH RVR (MULTI): ICD-10-CM

## 2024-11-25 DIAGNOSIS — I10 PRIMARY HYPERTENSION: ICD-10-CM

## 2024-11-25 PROCEDURE — 80061 LIPID PANEL: CPT

## 2024-11-25 PROCEDURE — 36415 COLL VENOUS BLD VENIPUNCTURE: CPT

## 2024-11-25 PROCEDURE — 80053 COMPREHEN METABOLIC PANEL: CPT

## 2024-11-25 PROCEDURE — 82306 VITAMIN D 25 HYDROXY: CPT

## 2024-11-25 PROCEDURE — 85027 COMPLETE CBC AUTOMATED: CPT

## 2024-11-25 PROCEDURE — 84443 ASSAY THYROID STIM HORMONE: CPT

## 2024-11-26 LAB
25(OH)D3 SERPL-MCNC: 140 NG/ML (ref 30–100)
ALBUMIN SERPL BCP-MCNC: 4.2 G/DL (ref 3.4–5)
ALP SERPL-CCNC: 42 U/L (ref 33–136)
ALT SERPL W P-5'-P-CCNC: 13 U/L (ref 7–45)
ANION GAP SERPL CALC-SCNC: 11 MMOL/L (ref 10–20)
AST SERPL W P-5'-P-CCNC: 22 U/L (ref 9–39)
BILIRUB SERPL-MCNC: 0.7 MG/DL (ref 0–1.2)
BUN SERPL-MCNC: 18 MG/DL (ref 6–23)
CALCIUM SERPL-MCNC: 10.8 MG/DL (ref 8.6–10.6)
CHLORIDE SERPL-SCNC: 105 MMOL/L (ref 98–107)
CHOLEST SERPL-MCNC: 165 MG/DL (ref 0–199)
CHOLESTEROL/HDL RATIO: 3.1
CO2 SERPL-SCNC: 28 MMOL/L (ref 21–32)
CREAT SERPL-MCNC: 1.09 MG/DL (ref 0.5–1.05)
EGFRCR SERPLBLD CKD-EPI 2021: 53 ML/MIN/1.73M*2
ERYTHROCYTE [DISTWIDTH] IN BLOOD BY AUTOMATED COUNT: 12.2 % (ref 11.5–14.5)
GLUCOSE SERPL-MCNC: 97 MG/DL (ref 74–99)
HCT VFR BLD AUTO: 37.2 % (ref 36–46)
HDLC SERPL-MCNC: 52.8 MG/DL
HGB BLD-MCNC: 12.2 G/DL (ref 12–16)
LDLC SERPL CALC-MCNC: 83 MG/DL
MCH RBC QN AUTO: 31.1 PG (ref 26–34)
MCHC RBC AUTO-ENTMCNC: 32.8 G/DL (ref 32–36)
MCV RBC AUTO: 95 FL (ref 80–100)
NON HDL CHOLESTEROL: 112 MG/DL (ref 0–149)
NRBC BLD-RTO: 0 /100 WBCS (ref 0–0)
PLATELET # BLD AUTO: 188 X10*3/UL (ref 150–450)
POTASSIUM SERPL-SCNC: 4.9 MMOL/L (ref 3.5–5.3)
PROT SERPL-MCNC: 6.7 G/DL (ref 6.4–8.2)
RBC # BLD AUTO: 3.92 X10*6/UL (ref 4–5.2)
SODIUM SERPL-SCNC: 139 MMOL/L (ref 136–145)
TRIGL SERPL-MCNC: 145 MG/DL (ref 0–149)
TSH SERPL-ACNC: 1.62 MIU/L (ref 0.44–3.98)
VLDL: 29 MG/DL (ref 0–40)
WBC # BLD AUTO: 6.3 X10*3/UL (ref 4.4–11.3)

## 2024-12-05 ENCOUNTER — APPOINTMENT (OUTPATIENT)
Dept: PRIMARY CARE | Facility: CLINIC | Age: 74
End: 2024-12-05
Payer: MEDICARE

## 2024-12-05 VITALS
OXYGEN SATURATION: 98 % | DIASTOLIC BLOOD PRESSURE: 60 MMHG | SYSTOLIC BLOOD PRESSURE: 124 MMHG | HEART RATE: 51 BPM | WEIGHT: 185.6 LBS | HEIGHT: 63 IN | BODY MASS INDEX: 32.89 KG/M2

## 2024-12-05 DIAGNOSIS — K21.9 GASTROESOPHAGEAL REFLUX DISEASE WITHOUT ESOPHAGITIS: ICD-10-CM

## 2024-12-05 DIAGNOSIS — T45.2X1A POISONING BY VITAMIN D, ACCIDENTAL OR UNINTENTIONAL, INITIAL ENCOUNTER: Primary | ICD-10-CM

## 2024-12-05 DIAGNOSIS — I10 PRIMARY HYPERTENSION: ICD-10-CM

## 2024-12-05 DIAGNOSIS — I48.91 ATRIAL FIBRILLATION WITH RVR (MULTI): ICD-10-CM

## 2024-12-05 DIAGNOSIS — M81.0 AGE-RELATED OSTEOPOROSIS WITHOUT CURRENT PATHOLOGICAL FRACTURE: ICD-10-CM

## 2024-12-05 DIAGNOSIS — E83.52 HYPERCALCEMIA: ICD-10-CM

## 2024-12-05 DIAGNOSIS — E78.00 HYPERCHOLESTEROLEMIA: ICD-10-CM

## 2024-12-05 PROBLEM — Z13.39 ALCOHOL SCREENING: Status: RESOLVED | Noted: 2023-03-09 | Resolved: 2024-12-05

## 2024-12-05 PROBLEM — Z13.89 SCREENING FOR MULTIPLE CONDITIONS: Status: RESOLVED | Noted: 2023-03-09 | Resolved: 2024-12-05

## 2024-12-05 PROCEDURE — 3078F DIAST BP <80 MM HG: CPT | Performed by: INTERNAL MEDICINE

## 2024-12-05 PROCEDURE — 1159F MED LIST DOCD IN RCRD: CPT | Performed by: INTERNAL MEDICINE

## 2024-12-05 PROCEDURE — 1036F TOBACCO NON-USER: CPT | Performed by: INTERNAL MEDICINE

## 2024-12-05 PROCEDURE — 1125F AMNT PAIN NOTED PAIN PRSNT: CPT | Performed by: INTERNAL MEDICINE

## 2024-12-05 PROCEDURE — 1160F RVW MEDS BY RX/DR IN RCRD: CPT | Performed by: INTERNAL MEDICINE

## 2024-12-05 PROCEDURE — 99214 OFFICE O/P EST MOD 30 MIN: CPT | Performed by: INTERNAL MEDICINE

## 2024-12-05 PROCEDURE — G2211 COMPLEX E/M VISIT ADD ON: HCPCS | Performed by: INTERNAL MEDICINE

## 2024-12-05 PROCEDURE — 1157F ADVNC CARE PLAN IN RCRD: CPT | Performed by: INTERNAL MEDICINE

## 2024-12-05 PROCEDURE — 3074F SYST BP LT 130 MM HG: CPT | Performed by: INTERNAL MEDICINE

## 2024-12-05 ASSESSMENT — ENCOUNTER SYMPTOMS
TROUBLE SWALLOWING: 0
NAUSEA: 0
DIZZINESS: 0
ARTHRALGIAS: 0
SORE THROAT: 0
FATIGUE: 0
CONSTIPATION: 0
SHORTNESS OF BREATH: 0
DYSURIA: 0
DIARRHEA: 0
LIGHT-HEADEDNESS: 0
FEVER: 0
COUGH: 0
PALPITATIONS: 0
ABDOMINAL PAIN: 0
VOMITING: 0
FREQUENCY: 0

## 2024-12-05 ASSESSMENT — PAIN SCALES - GENERAL: PAINLEVEL_OUTOF10: 4

## 2024-12-05 NOTE — PROGRESS NOTES
"Subjective   Patient ID: Vonnie A Friend \"[employer]" is a 74 y.o. female who presents for 4 month follow up for HTN and cholesterol management.     Patient is here today for routine 4-month follow-up for hypertension high cholesterol A-fib osteoporosis and medication management.  Patient had a few questions about her labs which we reviewed in detail today.  Most importantly her vitamin D level is toxic at 140 so I asked her to stop all forms of vitamin D including anything that may begin a multivitamin or calcium supplement.  Patient states understanding and will do so we will repeat her levels in a few months  Patient's calcium level also was mildly elevated that will be repeated with the vitamin D in a few months        Review of Systems   Constitutional:  Negative for fatigue and fever.   HENT:  Negative for sore throat and trouble swallowing.    Eyes:  Negative for visual disturbance.   Respiratory:  Negative for cough and shortness of breath.    Cardiovascular:  Negative for chest pain, palpitations and leg swelling.   Gastrointestinal:  Negative for abdominal pain, constipation, diarrhea, nausea and vomiting.   Genitourinary:  Negative for dysuria and frequency.   Musculoskeletal:  Negative for arthralgias.   Skin:  Negative for rash.   Neurological:  Negative for dizziness and light-headedness.       Objective   Medication Documentation Review Audit       Reviewed by Светлана Garcia MD (Physician) on 12/05/24 at 1132      Medication Order Taking? Sig Documenting Provider Last Dose Status   amLODIPine (Norvasc) 5 mg tablet [id number] No 1 tablet (5 mg). Historical Provider, MD Taking Active   apixaban (Eliquis) 5 mg tablet [id number] No Take 1 tablet (5 mg) by mouth in the morning and 1 tablet (5 mg) before bedtime. Historical Provider, MD Taking Active   ascorbic acid (Vitamin C) 500 mg tablet [id number] No Take 1 tablet (500 mg) by mouth once daily. Historical Provider, MD Taking Active   azelaic acid (Finacea) 15 % " gel [id number] No twice a day. Apply Sparingly and Massage in Well to Affected Area(s) Historical MD Abimael Taking Active   biotin 5,000 mcg tablet,chewable [id number]  Chew 5,000 mcg 2 times a day. Verenice Varghese MD  Active   CALCIUM ORAL [id number] No Take 1 tablet by mouth in the morning, at noon, and at bedtime. Verenice Varghese MD Taking Active   cholecalciferol (Vitamin D-3) 125 MCG (5000 UT) capsule [id number]  Take 1 capsule (125 mcg) by mouth 2 times a day. Verenice Varghese MD  Active   desonide (DesOwen) 0.05 % cream [id number] No Desonide 0.05 % External Cream   Quantity: 15  Refills: 0        Start : 27-Sep-2021   Active Verenice Varghese MD Taking Active   diphenhydrAMINE HCL (BenadryL) 2 % gel [id number] No 25 mg. Verenice Varghese MD Taking Active   dofetilide (Tikosyn) 500 mcg capsule [id number] No Take 1 capsule (500 mcg) by mouth in the morning and 1 capsule (500 mcg) before bedtime. Verenice Varghese MD Taking Active   famotidine (Pepcid) 40 mg tablet [id number] No Take by mouth. Historical MD Abimael Taking Active   GaviLyte-G 236-22.74-6.74 -5.86 gram solution [id number]   Verenice Varghese MD  Active   glucosamine/chondro howell A/C/Mn (GLUCOSAMINE-CHONDROITIN COMPLX ORAL) [id number] No Take 1 capsule by mouth in the morning and at bedtime. Verenice Varghese MD Taking Active   ketoconazole (NIZOral) 2 % shampoo [id number] No Ketoconazole 2 % External Shampoo   Quantity: 360  Refills: 0        Start : 8-Dec-2021   Active Historical MD Abimael Taking Active   losartan (Cozaar) 100 mg tablet [id number] No Take 1 tablet (100 mg) by mouth once daily. Verenice Varghese MD Taking Active   metoprolol succinate XL (Toprol-XL) 25 mg 24 hr tablet [id number] No Take by mouth. Alternate 1 whole tablet (25mg) per night with 1/2 tablet per night per Dr Cosme's office 12/12/22 Verenice Varghese MD Taking Active   MINOXIDIL TOP [id number] No Minoxidil SOLN   Refills: 0       Active Verenice Varghese MD Taking  "Active   MULTIVITAMIN ORAL [id number] No Take 1 tablet by mouth 1 (one) time each day. Historical Provider, MD Taking Active   omega 3-dha-epa-fish oil 1,000 mg (250 mg-750 mg)/5 mL liquid [id number]  Take 1,000 mcg by mouth 2 times a day. Historical Provider, MD  Active   pravastatin (Pravachol) 20 mg tablet [id number]  Take 1 tablet (20 mg) by mouth once daily. Светлана Garcia MD  Active                  Allergies   Allergen Reactions    Lisinopril Cough    Oxycodone-Acetaminophen Unknown       /60   Pulse 51   Ht 1.6 m (5' 3\")   Wt 84.2 kg (185 lb 9.6 oz)   SpO2 98%   BMI 32.88 kg/m²     Physical Exam  Constitutional:       Appearance: Normal appearance. She is obese.   HENT:      Head: Normocephalic and atraumatic.      Nose: Nose normal.   Eyes:      Extraocular Movements: Extraocular movements intact.      Pupils: Pupils are equal, round, and reactive to light.   Cardiovascular:      Rate and Rhythm: Normal rate and regular rhythm.   Pulmonary:      Breath sounds: Normal breath sounds.   Abdominal:      General: Abdomen is flat. Bowel sounds are normal.      Palpations: Abdomen is soft.   Musculoskeletal:      Right lower leg: No edema.      Left lower leg: No edema.   Neurological:      Mental Status: She is alert.           Assessment/Plan   Problem List Items Addressed This Visit       Atrial fibrillation with RVR (Multi)    Relevant Orders    Vitamin D 25-Hydroxy,Total (for eval of Vitamin D levels)    Esophageal reflux    Relevant Orders    Vitamin D 25-Hydroxy,Total (for eval of Vitamin D levels)    Hypercholesterolemia     Cholesterol levels are good and she will stay on pravastatin 20 mg daily.  Liver enzymes remain normal         Relevant Orders    Vitamin D 25-Hydroxy,Total (for eval of Vitamin D levels)    Hypertension     Blood pressure is stable and well-controlled.  She remains on amlodipine 5 mg daily         Relevant Orders    Vitamin D 25-Hydroxy,Total (for eval of Vitamin D levels)    " Osteoporosis     Patient will stop her vitamin D since she is toxic continue calcium and we will repeat her vitamin D levels in a few months.         Relevant Orders    Vitamin D 25-Hydroxy,Total (for eval of Vitamin D levels)     Other Visit Diagnoses       Poisoning by vitamin D, accidental or unintentional, initial encounter    -  Primary    Relevant Orders    Vitamin D 25-Hydroxy,Total (for eval of Vitamin D levels)    Hypercalcemia        Relevant Orders    Calcium    Vitamin D 25-Hydroxy,Total (for eval of Vitamin D levels)                   It has been a pleasure seeing you.  Светлана Garcia MD

## 2024-12-05 NOTE — ASSESSMENT & PLAN NOTE
Patient will stop her vitamin D since she is toxic continue calcium and we will repeat her vitamin D levels in a few months.

## 2024-12-05 NOTE — ASSESSMENT & PLAN NOTE
Cholesterol levels are good and she will stay on pravastatin 20 mg daily.  Liver enzymes remain normal

## 2024-12-05 NOTE — PATIENT INSTRUCTIONS
Get repeat calcium and Vit D levels after 2/6/25    Follow up Dr Garcia in March with 45 min wellness exam

## 2025-02-25 LAB
25(OH)D3+25(OH)D2 SERPL-MCNC: 90 NG/ML (ref 30–100)
CALCIUM SERPL-MCNC: 9.8 MG/DL (ref 8.6–10.4)

## 2025-03-04 ENCOUNTER — APPOINTMENT (OUTPATIENT)
Dept: PRIMARY CARE | Facility: CLINIC | Age: 75
End: 2025-03-04
Payer: MEDICARE

## 2025-03-04 VITALS
HEIGHT: 63 IN | DIASTOLIC BLOOD PRESSURE: 58 MMHG | WEIGHT: 185.2 LBS | BODY MASS INDEX: 32.82 KG/M2 | HEART RATE: 46 BPM | OXYGEN SATURATION: 98 % | SYSTOLIC BLOOD PRESSURE: 122 MMHG

## 2025-03-04 DIAGNOSIS — Z71.89 CARDIAC RISK COUNSELING: ICD-10-CM

## 2025-03-04 DIAGNOSIS — I10 PRIMARY HYPERTENSION: ICD-10-CM

## 2025-03-04 DIAGNOSIS — I48.91 ATRIAL FIBRILLATION WITH RVR (MULTI): ICD-10-CM

## 2025-03-04 DIAGNOSIS — Z00.00 WELLNESS EXAMINATION: ICD-10-CM

## 2025-03-04 DIAGNOSIS — R00.1 BRADYCARDIA: ICD-10-CM

## 2025-03-04 DIAGNOSIS — Z71.89 ACP (ADVANCE CARE PLANNING): ICD-10-CM

## 2025-03-04 DIAGNOSIS — E66.01 MORBID (SEVERE) OBESITY DUE TO EXCESS CALORIES (MULTI): ICD-10-CM

## 2025-03-04 DIAGNOSIS — E78.00 HYPERCHOLESTEROLEMIA: ICD-10-CM

## 2025-03-04 DIAGNOSIS — Z78.9 FULL CODE STATUS: Primary | ICD-10-CM

## 2025-03-04 PROCEDURE — 1158F ADVNC CARE PLAN TLK DOCD: CPT | Performed by: INTERNAL MEDICINE

## 2025-03-04 PROCEDURE — 99497 ADVNCD CARE PLAN 30 MIN: CPT | Performed by: INTERNAL MEDICINE

## 2025-03-04 PROCEDURE — G0439 PPPS, SUBSEQ VISIT: HCPCS | Performed by: INTERNAL MEDICINE

## 2025-03-04 PROCEDURE — 3078F DIAST BP <80 MM HG: CPT | Performed by: INTERNAL MEDICINE

## 2025-03-04 PROCEDURE — 1170F FXNL STATUS ASSESSED: CPT | Performed by: INTERNAL MEDICINE

## 2025-03-04 PROCEDURE — 1157F ADVNC CARE PLAN IN RCRD: CPT | Performed by: INTERNAL MEDICINE

## 2025-03-04 PROCEDURE — 1126F AMNT PAIN NOTED NONE PRSNT: CPT | Performed by: INTERNAL MEDICINE

## 2025-03-04 PROCEDURE — 3074F SYST BP LT 130 MM HG: CPT | Performed by: INTERNAL MEDICINE

## 2025-03-04 PROCEDURE — 1123F ACP DISCUSS/DSCN MKR DOCD: CPT | Performed by: INTERNAL MEDICINE

## 2025-03-04 PROCEDURE — G0446 INTENS BEHAVE THER CARDIO DX: HCPCS | Performed by: INTERNAL MEDICINE

## 2025-03-04 PROCEDURE — 99214 OFFICE O/P EST MOD 30 MIN: CPT | Performed by: INTERNAL MEDICINE

## 2025-03-04 PROCEDURE — 1159F MED LIST DOCD IN RCRD: CPT | Performed by: INTERNAL MEDICINE

## 2025-03-04 RX ORDER — AMLODIPINE BESYLATE 5 MG/1
2.5 TABLET ORAL DAILY
Qty: 30 TABLET | Refills: 5 | Status: SHIPPED | OUTPATIENT
Start: 2025-03-04

## 2025-03-04 SDOH — ECONOMIC STABILITY: FOOD INSECURITY: WITHIN THE PAST 12 MONTHS, YOU WORRIED THAT YOUR FOOD WOULD RUN OUT BEFORE YOU GOT MONEY TO BUY MORE.: NEVER TRUE

## 2025-03-04 SDOH — ECONOMIC STABILITY: FOOD INSECURITY: WITHIN THE PAST 12 MONTHS, THE FOOD YOU BOUGHT JUST DIDN'T LAST AND YOU DIDN'T HAVE MONEY TO GET MORE.: NEVER TRUE

## 2025-03-04 SDOH — HEALTH STABILITY: PHYSICAL HEALTH: ON AVERAGE, HOW MANY DAYS PER WEEK DO YOU ENGAGE IN MODERATE TO STRENUOUS EXERCISE (LIKE A BRISK WALK)?: 0 DAYS

## 2025-03-04 SDOH — ECONOMIC STABILITY: INCOME INSECURITY: IN THE LAST 12 MONTHS, WAS THERE A TIME WHEN YOU WERE NOT ABLE TO PAY THE MORTGAGE OR RENT ON TIME?: NO

## 2025-03-04 SDOH — ECONOMIC STABILITY: GENERAL
WHICH OF THE FOLLOWING WOULD YOU LIKE TO GET CONNECTED TO IN ORDER TO RECEIVE A DISCOUNT OR FOR FREE? (CHOOSE ALL THAT APPLY): NO ASSISTANCE NEEDED

## 2025-03-04 SDOH — ECONOMIC STABILITY: GENERAL
WHICH OF THE FOLLOWING DO YOU KNOW HOW TO USE AND HAVE ACCESS TO EVERY DAY? (CHOOSE ALL THAT APPLY): SMARTPHONE WITH CELLULAR DATA PLAN;DESKTOP COMPUTER, LAPTOP COMPUTER, OR TABLET WITH BROADBAND INTERNET CONNECTION

## 2025-03-04 SDOH — HEALTH STABILITY: PHYSICAL HEALTH: ON AVERAGE, HOW MANY MINUTES DO YOU ENGAGE IN EXERCISE AT THIS LEVEL?: 0 MIN

## 2025-03-04 ASSESSMENT — ACTIVITIES OF DAILY LIVING (ADL)
ADEQUATE_TO_COMPLETE_ADL: YES
MANAGING FINANCES: INDEPENDENT
HEARING - LEFT EAR: FUNCTIONAL
BATHING: INDEPENDENT
DRESSING YOURSELF: INDEPENDENT
WALKS IN HOME: INDEPENDENT
USING TELEPHONE: INDEPENDENT
JUDGMENT_ADEQUATE_SAFELY_COMPLETE_DAILY_ACTIVITIES: YES
FEEDING YOURSELF: INDEPENDENT
DOING HOUSEWORK: INDEPENDENT
EATING: INDEPENDENT
TOILETING: INDEPENDENT
PREPARING MEALS: INDEPENDENT
HEARING - RIGHT EAR: FUNCTIONAL
GROOMING: INDEPENDENT
NEEDS ASSISTANCE WITH FOOD: INDEPENDENT
PILL BOX USED: YES
USING TRANSPORTATION: INDEPENDENT
TAKING MEDICATION: INDEPENDENT
STIL DRIVING: YES
GROCERY SHOPPING: INDEPENDENT
PATIENT'S MEMORY ADEQUATE TO SAFELY COMPLETE DAILY ACTIVITIES?: YES

## 2025-03-04 ASSESSMENT — LIFESTYLE VARIABLES
SKIP TO QUESTIONS 9-10: 1
HOW OFTEN DO YOU HAVE A DRINK CONTAINING ALCOHOL: NEVER
HOW OFTEN DO YOU HAVE A DRINK CONTAINING ALCOHOL: NEVER
HOW OFTEN DO YOU HAVE SIX OR MORE DRINKS ON ONE OCCASION: NEVER
AUDIT-C TOTAL SCORE: 0
SKIP TO QUESTIONS 9-10: 1
HOW MANY STANDARD DRINKS CONTAINING ALCOHOL DO YOU HAVE ON A TYPICAL DAY: PATIENT DOES NOT DRINK
HOW OFTEN DO YOU HAVE SIX OR MORE DRINKS ON ONE OCCASION: NEVER
AUDIT-C TOTAL SCORE: 0
HOW MANY STANDARD DRINKS CONTAINING ALCOHOL DO YOU HAVE ON A TYPICAL DAY: PATIENT DOES NOT DRINK

## 2025-03-04 ASSESSMENT — SOCIAL DETERMINANTS OF HEALTH (SDOH)
IN A TYPICAL WEEK, HOW MANY TIMES DO YOU TALK ON THE PHONE WITH FAMILY, FRIENDS, OR NEIGHBORS?: MORE THAN THREE TIMES A WEEK
WITHIN THE LAST YEAR, HAVE YOU BEEN HUMILIATED OR EMOTIONALLY ABUSED IN OTHER WAYS BY YOUR PARTNER OR EX-PARTNER?: NO
HOW OFTEN DO YOU ATTEND CHURCH OR RELIGIOUS SERVICES?: NEVER
HOW OFTEN DO YOU GET TOGETHER WITH FRIENDS OR RELATIVES?: TWICE A WEEK
ARE YOU MARRIED, WIDOWED, DIVORCED, SEPARATED, NEVER MARRIED, OR LIVING WITH A PARTNER?: NEVER MARRIED
HOW OFTEN DO YOU ATTENT MEETINGS OF THE CLUB OR ORGANIZATION YOU BELONG TO?: NEVER
WITHIN THE LAST YEAR, HAVE TO BEEN RAPED OR FORCED TO HAVE ANY KIND OF SEXUAL ACTIVITY BY YOUR PARTNER OR EX-PARTNER?: NO
IN THE PAST 12 MONTHS, HAS THE ELECTRIC, GAS, OIL, OR WATER COMPANY THREATENED TO SHUT OFF SERVICE IN YOUR HOME?: NO
HOW HARD IS IT FOR YOU TO PAY FOR THE VERY BASICS LIKE FOOD, HOUSING, MEDICAL CARE, AND HEATING?: NOT HARD AT ALL
WITHIN THE LAST YEAR, HAVE YOU BEEN KICKED, HIT, SLAPPED, OR OTHERWISE PHYSICALLY HURT BY YOUR PARTNER OR EX-PARTNER?: NO
WITHIN THE LAST YEAR, HAVE YOU BEEN AFRAID OF YOUR PARTNER OR EX-PARTNER?: NO
DO YOU BELONG TO ANY CLUBS OR ORGANIZATIONS SUCH AS CHURCH GROUPS UNIONS, FRATERNAL OR ATHLETIC GROUPS, OR SCHOOL GROUPS?: NO

## 2025-03-04 ASSESSMENT — PAIN SCALES - GENERAL: PAINLEVEL_OUTOF10: 0-NO PAIN

## 2025-03-04 NOTE — PATIENT INSTRUCTIONS
Please decrease amlodipine to 2.5mg a day (half of the 5 mg pill)    Follow up Dr Garcia in 1 month for BP check  30 min appointment

## 2025-03-04 NOTE — PROGRESS NOTES
Subjective   Reason for Visit: Vonnie A Friend is an 74 y.o. female here for a Medicare Wellness visit.               Patient is here for annual wellness visit but also for management of her hypertension, A-fib, osteoporosis and Ferrera's esophagus with reflux symptoms.  Patient is felt unsteady on her feet recently.  She feels a little lightheaded or wobbly.  This is occurred since February 27 and occurs 2-3 times a day with periods lasting 5 or 6 minutes.  1 time with during an episode she checked her blood pressure and it was 95/43 with a pulse of 57.  At this time I believe the low blood pressure may be triggering her symptoms so we will cut back on her amlodipine and reassess her in a month    Advance care planning was reviewed with the patient in some detail.  She basically wants everything done but does not want to be a burden to others or being in a nursing home if at all possible.  She does have a power of  and living will on record with us    Cardiac risk counseling was also explained to the patient.  Her current 10-year ASCVD risk is at 19%.  She is seeing a cardiologist Dr. Cosme and will continue to follow-up with him.  Patient was counseled approximately 16 to 17 minutes.  She is encouraged to work on weight loss exercise but she does not need to take an aspirin daily.        Patient Care Team:  Светлана Garcia MD as PCP - General  Светлана Garcia MD as PCP - [employer] PCP  Keiry Ordoñez DPM as Consulting Physician (Podiatry)  Anthony Arzate MD as Referring Physician (Dermatology)     Review of Systems   Constitutional:  Negative for fatigue and fever.   HENT:  Negative for sore throat and trouble swallowing.    Eyes:  Negative for visual disturbance.   Respiratory:  Negative for cough and shortness of breath.    Cardiovascular:  Negative for chest pain, palpitations and leg swelling.   Gastrointestinal:  Negative for abdominal pain, constipation, diarrhea, nausea and  "vomiting.   Genitourinary:  Negative for dysuria and frequency.   Musculoskeletal:  Negative for arthralgias.   Skin:  Negative for rash.   Neurological:  Negative for dizziness and light-headedness.       Objective   Vitals:  /58   Pulse (!) 46   Ht 1.6 m (5' 3\")   Wt 84 kg (185 lb 3.2 oz)   SpO2 98%   BMI 32.81 kg/m²       Physical Exam  Constitutional:       Appearance: Normal appearance. She is obese.   HENT:      Head: Normocephalic and atraumatic.      Nose: Nose normal.   Eyes:      Extraocular Movements: Extraocular movements intact.      Pupils: Pupils are equal, round, and reactive to light.   Cardiovascular:      Rate and Rhythm: Normal rate and regular rhythm.   Pulmonary:      Breath sounds: Normal breath sounds.   Abdominal:      General: Abdomen is flat. Bowel sounds are normal.      Palpations: Abdomen is soft.   Musculoskeletal:      Right lower leg: No edema.      Left lower leg: No edema.   Neurological:      Mental Status: She is alert.         Assessment & Plan  Full code status  CODE STATUS was discussed with the patient today and they wish to be a full code.  Patient understands that this may include CPR, cardioversion, intubation and ventilation if necessary.           ACP (advance care planning)  Long discussion about advance care planning today lasting approximately 20 minutes.  Patient already has a power of  and living will in place.  Her list was updated.  Actively she would like to remain a full code but we also discussed what long-term measure she would like.  It is very important she says that she stay independently cognitively alert.  If she starts having mental confusion and is not able to mentally take care of herself she would not want aggressive measures.  She is not opposed to a trach or feeding tube if necessary but her mental faculties appear to be the most important issue for her.  She would want to be able to communicate with others and hopefully make her " own decisions.  If she becomes dependent on others and in a nursing home she said she would rather not have aggressive measures taken           Primary hypertension  Blood pressure stable well-controlled today no changes will be made.         Hypercholesterolemia  Annual labs and cholesterol were checked in November.  Patient will remain on pravastatin 20 mg daily         Cardiac risk counseling  Cardiovascular risk discussed and, if needed, lifestyle modifications recommended, including nutritional choices, exercise, and elimination of habits contributing to risk.  We agreed on a plan to reduce the current cardiovascular risk.  Aspirin use/disuse was discussed after reviewing updated guidelines. Greater than 15 min in addition to visit time was spent in discussion and education of patient.  Patient's current 10-year ASCVD risk is at 19%.  To modify her risk she was recommended to work on weight loss and exercise as that would help more than anything else at this time.         Bradycardia         Atrial fibrillation with RVR (Multi)  Patient already has a follow-up with her cardiologist Dr. Cosme         Morbid (severe) obesity due to excess calories (Multi)         Wellness examination  Annual wellness visit completed.  Patient is independent in all of her ADLs and IADLs.  Safety issues have all been met in the home despite the fact that she is actually fallen twice recently.  We did discuss physical therapy to get her stronger but she is not interested at this time.

## 2025-03-05 PROBLEM — Z71.89 CARDIAC RISK COUNSELING: Status: ACTIVE | Noted: 2025-03-05

## 2025-03-05 ASSESSMENT — ENCOUNTER SYMPTOMS
PALPITATIONS: 0
TROUBLE SWALLOWING: 0
FREQUENCY: 0
DIARRHEA: 0
DYSURIA: 0
NAUSEA: 0
CONSTIPATION: 0
SHORTNESS OF BREATH: 0
ARTHRALGIAS: 0
DIZZINESS: 0
LIGHT-HEADEDNESS: 0
FATIGUE: 0
COUGH: 0
ABDOMINAL PAIN: 0
SORE THROAT: 0
VOMITING: 0
FEVER: 0

## 2025-03-05 NOTE — ASSESSMENT & PLAN NOTE
Annual wellness visit completed.  Patient is independent in all of her ADLs and IADLs.  Safety issues have all been met in the home despite the fact that she is actually fallen twice recently.  We did discuss physical therapy to get her stronger but she is not interested at this time.

## 2025-03-05 NOTE — ASSESSMENT & PLAN NOTE
Long discussion about advance care planning today lasting approximately 20 minutes.  Patient already has a power of  and living will in place.  Her list was updated.  Actively she would like to remain a full code but we also discussed what long-term measure she would like.  It is very important she says that she stay independently cognitively alert.  If she starts having mental confusion and is not able to mentally take care of herself she would not want aggressive measures.  She is not opposed to a trach or feeding tube if necessary but her mental faculties appear to be the most important issue for her.  She would want to be able to communicate with others and hopefully make her own decisions.  If she becomes dependent on others and in a nursing home she said she would rather not have aggressive measures taken

## 2025-03-05 NOTE — ASSESSMENT & PLAN NOTE
Cardiovascular risk discussed and, if needed, lifestyle modifications recommended, including nutritional choices, exercise, and elimination of habits contributing to risk.  We agreed on a plan to reduce the current cardiovascular risk.  Aspirin use/disuse was discussed after reviewing updated guidelines. Greater than 15 min in addition to visit time was spent in discussion and education of patient.  Patient's current 10-year ASCVD risk is at 19%.  To modify her risk she was recommended to work on weight loss and exercise as that would help more than anything else at this time.

## 2025-03-05 NOTE — ASSESSMENT & PLAN NOTE
Annual labs and cholesterol were checked in November.  Patient will remain on pravastatin 20 mg daily

## 2025-04-14 ENCOUNTER — TELEPHONE (OUTPATIENT)
Dept: PRIMARY CARE | Facility: CLINIC | Age: 75
End: 2025-04-14
Payer: MEDICARE

## 2025-04-14 DIAGNOSIS — E78.00 HYPERCHOLESTEROLEMIA: ICD-10-CM

## 2025-04-14 RX ORDER — PRAVASTATIN SODIUM 20 MG/1
20 TABLET ORAL DAILY
Qty: 90 TABLET | Refills: 3 | Status: SHIPPED | OUTPATIENT
Start: 2025-04-14

## 2025-04-14 NOTE — TELEPHONE ENCOUNTER
Patient LMOM  She said to tell Dr Garcia that she is feeling much better.  She has been cutting the amlodipine in half, however, it is very small and so it crumbles and she doesn't know how much she is actually getting  Her question is: Does she still need to take it since she is feeling better AND can it called in as 2.5 mg tablet so she doesn't have to cut in in half?    [place]

## 2025-04-15 ENCOUNTER — APPOINTMENT (OUTPATIENT)
Dept: PRIMARY CARE | Facility: CLINIC | Age: 75
End: 2025-04-15
Payer: MEDICARE

## 2025-04-15 NOTE — TELEPHONE ENCOUNTER
Patient notified and given recommendation.     Patient state she will take it a couple times this evening and again it the morning and then call back with results as the dr requested.

## 2025-04-16 DIAGNOSIS — I10 PRIMARY HYPERTENSION: Primary | ICD-10-CM

## 2025-04-16 RX ORDER — AMLODIPINE BESYLATE 2.5 MG/1
2.5 TABLET ORAL DAILY
Qty: 30 TABLET | Refills: 5 | Status: SHIPPED | OUTPATIENT
Start: 2025-04-16 | End: 2025-10-13

## 2025-04-16 NOTE — TELEPHONE ENCOUNTER
Pt called this morning with her blood pressure readings from yesterday.    138/49 pulse- 53  123/42 pulse- 50  123/43 pulse- 48  141-48 pulse- 46  131/42 pulse- 42    Please advise.

## 2025-05-14 DIAGNOSIS — I10 PRIMARY HYPERTENSION: ICD-10-CM

## 2025-05-14 RX ORDER — AMLODIPINE BESYLATE 2.5 MG/1
2.5 TABLET ORAL DAILY
Qty: 90 TABLET | Refills: 1 | Status: SHIPPED | OUTPATIENT
Start: 2025-05-14 | End: 2025-11-10

## 2025-05-14 NOTE — TELEPHONE ENCOUNTER
Pt would like the Amlodipine sent to [facility]. She is feeling fine taking this dosage, but the local pharmacy is going to charge her much more, so she has to get this from the mail away.

## 2025-05-23 ENCOUNTER — APPOINTMENT (OUTPATIENT)
Dept: PRIMARY CARE | Facility: CLINIC | Age: 75
End: 2025-05-23
Payer: MEDICARE

## 2025-06-04 ENCOUNTER — APPOINTMENT (OUTPATIENT)
Dept: PRIMARY CARE | Facility: CLINIC | Age: 75
End: 2025-06-04
Payer: MEDICARE

## 2025-06-04 VITALS
HEART RATE: 46 BPM | HEIGHT: 63 IN | WEIGHT: 183.6 LBS | DIASTOLIC BLOOD PRESSURE: 68 MMHG | BODY MASS INDEX: 32.53 KG/M2 | SYSTOLIC BLOOD PRESSURE: 127 MMHG | OXYGEN SATURATION: 99 %

## 2025-06-04 DIAGNOSIS — E78.00 HYPERCHOLESTEROLEMIA: ICD-10-CM

## 2025-06-04 DIAGNOSIS — T45.2X1D POISONING BY VITAMIN D, ACCIDENTAL OR UNINTENTIONAL, SUBSEQUENT ENCOUNTER: ICD-10-CM

## 2025-06-04 DIAGNOSIS — L20.89 FLEXURAL ATOPIC DERMATITIS: ICD-10-CM

## 2025-06-04 DIAGNOSIS — N18.31 CHRONIC KIDNEY DISEASE, STAGE 3A (MULTI): ICD-10-CM

## 2025-06-04 DIAGNOSIS — I10 PRIMARY HYPERTENSION: Primary | ICD-10-CM

## 2025-06-04 DIAGNOSIS — R00.1 BRADYCARDIA: ICD-10-CM

## 2025-06-04 PROBLEM — T45.2X1A VITAMIN D TOXICITY: Status: ACTIVE | Noted: 2025-06-04

## 2025-06-04 PROCEDURE — 99214 OFFICE O/P EST MOD 30 MIN: CPT | Performed by: INTERNAL MEDICINE

## 2025-06-04 PROCEDURE — 1126F AMNT PAIN NOTED NONE PRSNT: CPT | Performed by: INTERNAL MEDICINE

## 2025-06-04 PROCEDURE — 1159F MED LIST DOCD IN RCRD: CPT | Performed by: INTERNAL MEDICINE

## 2025-06-04 PROCEDURE — 1160F RVW MEDS BY RX/DR IN RCRD: CPT | Performed by: INTERNAL MEDICINE

## 2025-06-04 PROCEDURE — 3078F DIAST BP <80 MM HG: CPT | Performed by: INTERNAL MEDICINE

## 2025-06-04 PROCEDURE — G2211 COMPLEX E/M VISIT ADD ON: HCPCS | Performed by: INTERNAL MEDICINE

## 2025-06-04 PROCEDURE — 1036F TOBACCO NON-USER: CPT | Performed by: INTERNAL MEDICINE

## 2025-06-04 PROCEDURE — 3074F SYST BP LT 130 MM HG: CPT | Performed by: INTERNAL MEDICINE

## 2025-06-04 ASSESSMENT — PAIN SCALES - GENERAL: PAINLEVEL_OUTOF10: 0-NO PAIN

## 2025-06-04 ASSESSMENT — ENCOUNTER SYMPTOMS
DIFFICULTY URINATING: 0
LIGHT-HEADEDNESS: 0
SHORTNESS OF BREATH: 0
PALPITATIONS: 0
HEADACHES: 0
SORE THROAT: 0
DIARRHEA: 0
DIZZINESS: 0
CHILLS: 0
CONSTIPATION: 0
FEVER: 0
COUGH: 0

## 2025-06-04 ASSESSMENT — PATIENT HEALTH QUESTIONNAIRE - PHQ9
SUM OF ALL RESPONSES TO PHQ9 QUESTIONS 1 AND 2: 0
2. FEELING DOWN, DEPRESSED OR HOPELESS: NOT AT ALL
1. LITTLE INTEREST OR PLEASURE IN DOING THINGS: NOT AT ALL

## 2025-06-04 NOTE — PROGRESS NOTES
"Subjective   Patient ID: Vonnie A Friend \"[employer]" is a 74 y.o. female who presents for No chief complaint on file..  Patient is here for 4 month follow up of hypertension and hypercholesterolemia.  Patient does not need any refills at this time.   Patient had question whether she should restart vitamin d3 supplementation.         Review of Systems   Constitutional:  Negative for chills and fever.   HENT:  Negative for sore throat.    Eyes:  Negative for visual disturbance.   Respiratory:  Negative for cough and shortness of breath.    Cardiovascular:  Negative for chest pain and palpitations.   Gastrointestinal:  Negative for constipation and diarrhea.   Genitourinary:  Negative for difficulty urinating.   Skin:  Positive for rash.        Bilateral erythematous rash on antecubital spaces.    Neurological:  Negative for dizziness, syncope, light-headedness and headaches.       Objective   Medication Documentation Review Audit       Reviewed by Светлана Garcia MD (Physician) on 06/04/25 at 1311      Medication Order Taking? Sig Documenting Provider Last Dose Status   amLODIPine (Norvasc) 2.5 mg tablet [id number] Yes Take 1 tablet (2.5 mg) by mouth once daily. Светлана Garcia MD  Active   apixaban (Eliquis) 5 mg tablet [id number] Yes Take 1 tablet (5 mg) by mouth 2 times a day. Historical Provider, MD Taking Active   ascorbic acid (Vitamin C) 500 mg tablet [id number] Yes Take 1 tablet (500 mg) by mouth once daily. Historical Provider, MD Taking Active   azelaic acid (Finacea) 15 % gel [id number] Yes twice a day. Apply Sparingly and Massage in Well to Affected Area(s) Historical Provider, MD Taking Active   biotin 5,000 mcg tablet,chewable [id number] Yes Chew 5,000 mcg 2 times a day. Historical Provider, MD  Active   CALCIUM ORAL [id number] Yes Take 1 tablet by mouth in the morning, at noon, and at bedtime. Historical Provider, MD Taking Active   cholecalciferol (Vitamin D-3) 125 MCG (5000 UT) capsule [id number]  Take 1 capsule (125 " mcg) by mouth 2 times a day.   Patient not taking: Reported on 6/4/2025    Historical Provider, MD  Active   desonide (DesOwen) 0.05 % cream [id number] Yes Desonide 0.05 % External Cream   Quantity: 15  Refills: 0        Start : 27-Sep-2021   Active Historical MD Abimael Taking Active   diphenhydrAMINE HCL (BenadryL) 2 % gel [id number] Yes 25 mg. Historical Provider, MD Taking Active   dofetilide (Tikosyn) 500 mcg capsule [id number] Yes Take 1 capsule (500 mcg) by mouth 2 times a day. Historical Provider, MD Taking Active   famotidine (Pepcid) 40 mg tablet [id number] Yes Take by mouth. Historical Provider, MD Taking Active   GaviLyte-G 236-22.74-6.74 -5.86 gram solution [id number]     Patient not taking: Reported on 3/4/2025    Historical MD Abimael  Active   glucosamine/chondro howell A/C/Mn (GLUCOSAMINE-CHONDROITIN COMPLX ORAL) [id number] Yes Take 1 capsule by mouth in the morning and at bedtime. Historical MD Abimael Taking Active   ketoconazole (NIZOral) 2 % shampoo [id number] Yes Ketoconazole 2 % External Shampoo   Quantity: 360  Refills: 0        Start : 8-Dec-2021   Active Historical MD Abimael Taking Active   losartan (Cozaar) 100 mg tablet [id number] Yes Take 1 tablet (100 mg) by mouth once daily. Historical MD Abimael Taking Active   metoprolol succinate XL (Toprol-XL) 25 mg 24 hr tablet [id number] Yes Take by mouth. Alternate 1 whole tablet (25mg) per night with 1/2 tablet per night per Dr Cosme's office 12/12/22 Historical MD Abimael Taking Active   MINOXIDIL TOP [id number] Yes Minoxidil SOLN   Refills: 0       Active Historical MD Abimael Taking Active   MULTIVITAMIN ORAL [id number] Yes Take 1 tablet by mouth 1 (one) time each day. Historical MD Abimael Taking Active   omega 3-dha-epa-fish oil 1,000 mg (250 mg-750 mg)/5 mL liquid [id number] Yes Take 1,000 mcg by mouth 2 times a day. Historical MD Abimael  Active   pravastatin (Pravachol) 20 mg tablet [id number] Yes Take 1 tablet (20 mg) by mouth once daily. Светлана Garcia,  "MD  Active                  Allergies[1]    /68   Pulse (!) 46   Ht 1.6 m (5' 3\")   Wt 83.3 kg (183 lb 9.6 oz)   SpO2 99%   BMI 32.52 kg/m²     Physical Exam  Constitutional:       Appearance: Normal appearance.   HENT:      Head: Normocephalic and atraumatic.      Nose: Nose normal.      Mouth/Throat:      Mouth: Mucous membranes are moist.   Cardiovascular:      Rate and Rhythm: Regular rhythm. Bradycardia present.      Pulses: Normal pulses.      Heart sounds: Normal heart sounds.   Pulmonary:      Effort: Pulmonary effort is normal.      Breath sounds: Normal breath sounds.   Abdominal:      General: Abdomen is flat.      Palpations: Abdomen is soft.   Skin:     General: Skin is warm.      Findings: Rash present.      Comments: Bilateral erythematous rash on antecubital spaces.   Neurological:      Mental Status: She is alert.           Assessment/Plan   Problem List Items Addressed This Visit       Atopic dermatitis    Patient has erythematous rash on bilateral antecubital spaces. Patient follows with dermatology and is managed with desonide 0.05% cream.         Hypercholesterolemia    Patient is stable and managed pravastatin 20 mg daily.     Follow up with Dr. LY in 4 months for continued management.    Annual labs due in November         Hypertension - Primary    Patient is stable and managed on losartan 100 mg daily.     Follow up with Dr. LY in 4 months for continued management.         Bradycardia    Patient is experiencing no dizziness or lightheadedness. Patient follows with Cardiology, Dr. Boland in 2-3 weeks and instructed to discuss this with him. Patient is stable at this pulse rate at this visit and the visit in March.    Patient counseled that this may be due to her medications but stable.         Vitamin D toxicity    Patient had level of vitamin D in 11/25/2024 of 140, this level decreased to 90 in 2/25/2025. Patient has not been taking her multivitamin with vitamin D3 or her " biotin containing vitamin D3. Patient switched her calcium supplement to without vitamin D as well during this time and is continuing this formulation.     Patient wanted to know if she was able to restart taking vitamin D. It was recommended that she can either take her biotin or her multivitamin, but not both. Patient's vitamin D levels will be rechecked with her annual labs in November.          Chronic kidney disease, stage 3a (Multi)    Chronic renal insufficiency is stable.  Repeat labs will be obtained in November            This note or encounter for this patient visit included a student.  I have independently interviewed the patient, performed a physical exam and performed my own assessment and plan and orders.         It has been a pleasure seeing you.  Светлана Garcia MD         [1]   Allergies  Allergen Reactions    Lisinopril Cough    Oxycodone-Acetaminophen Unknown

## 2025-06-04 NOTE — ASSESSMENT & PLAN NOTE
Patient has erythematous rash on bilateral antecubital spaces. Patient follows with dermatology and is managed with desonide 0.05% cream.

## 2025-06-04 NOTE — ASSESSMENT & PLAN NOTE
Patient is stable and managed on losartan 100 mg daily.     Follow up with Dr. LY in 4 months for continued management.

## 2025-06-04 NOTE — ASSESSMENT & PLAN NOTE
Patient had level of vitamin D in 11/25/2024 of 140, this level decreased to 90 in 2/25/2025. Patient has not been taking her multivitamin with vitamin D3 or her biotin containing vitamin D3. Patient switched her calcium supplement to without vitamin D as well during this time and is continuing this formulation.     Patient wanted to know if she was able to restart taking vitamin D. It was recommended that she can either take her biotin or her multivitamin, but not both. Patient's vitamin D levels will be rechecked with her annual labs in November.

## 2025-06-04 NOTE — ASSESSMENT & PLAN NOTE
Patient is stable and managed pravastatin 20 mg daily.     Follow up with Dr. LY in 4 months for continued management.    Annual labs due in November

## 2025-06-04 NOTE — ASSESSMENT & PLAN NOTE
Patient is experiencing no dizziness or lightheadedness. Patient follows with Cardiology, Dr. Boland in 2-3 weeks and instructed to discuss this with him. Patient is stable at this pulse rate at this visit and the visit in March.    Patient counseled that this may be due to her medications but stable.

## 2025-07-07 ENCOUNTER — TELEPHONE (OUTPATIENT)
Dept: PRIMARY CARE | Facility: CLINIC | Age: 75
End: 2025-07-07
Payer: MEDICARE

## 2025-07-07 DIAGNOSIS — I48.91 ATRIAL FIBRILLATION WITH RVR (MULTI): Primary | ICD-10-CM

## 2025-07-07 NOTE — TELEPHONE ENCOUNTER
Patient states she normally get a bone density and mammogram in July and she said she for got to ask about it and want to know do she need the mammogram every so often due to her age (74)    Please ad vise

## 2025-07-08 DIAGNOSIS — Z78.0 ASYMPTOMATIC MENOPAUSAL STATE: ICD-10-CM

## 2025-07-08 DIAGNOSIS — Z12.31 VISIT FOR SCREENING MAMMOGRAM: Primary | ICD-10-CM

## 2025-08-11 ENCOUNTER — HOSPITAL ENCOUNTER (OUTPATIENT)
Dept: RADIOLOGY | Facility: CLINIC | Age: 75
Discharge: HOME | End: 2025-08-11
Payer: MEDICARE

## 2025-08-11 VITALS — HEIGHT: 63 IN | WEIGHT: 183 LBS | BODY MASS INDEX: 32.43 KG/M2

## 2025-08-11 DIAGNOSIS — Z12.31 VISIT FOR SCREENING MAMMOGRAM: ICD-10-CM

## 2025-08-11 DIAGNOSIS — Z78.0 ASYMPTOMATIC MENOPAUSAL STATE: ICD-10-CM

## 2025-08-11 PROCEDURE — 77080 DXA BONE DENSITY AXIAL: CPT

## 2025-08-11 PROCEDURE — 77080 DXA BONE DENSITY AXIAL: CPT | Performed by: STUDENT IN AN ORGANIZED HEALTH CARE EDUCATION/TRAINING PROGRAM

## 2025-08-11 PROCEDURE — 77067 SCR MAMMO BI INCL CAD: CPT

## 2025-08-11 PROCEDURE — 77063 BREAST TOMOSYNTHESIS BI: CPT | Performed by: RADIOLOGY

## 2025-08-11 PROCEDURE — 77067 SCR MAMMO BI INCL CAD: CPT | Performed by: RADIOLOGY

## 2025-08-13 ENCOUNTER — RESULTS FOLLOW-UP (OUTPATIENT)
Dept: PRIMARY CARE | Facility: CLINIC | Age: 75
End: 2025-08-13
Payer: MEDICARE

## 2025-08-15 ENCOUNTER — TELEPHONE (OUTPATIENT)
Dept: PRIMARY CARE | Facility: CLINIC | Age: 75
End: 2025-08-15
Payer: MEDICARE

## 2025-10-08 ENCOUNTER — APPOINTMENT (OUTPATIENT)
Dept: PRIMARY CARE | Facility: CLINIC | Age: 75
End: 2025-10-08
Payer: MEDICARE